# Patient Record
Sex: MALE | Race: WHITE | NOT HISPANIC OR LATINO
[De-identification: names, ages, dates, MRNs, and addresses within clinical notes are randomized per-mention and may not be internally consistent; named-entity substitution may affect disease eponyms.]

---

## 2017-08-05 ENCOUNTER — TRANSCRIPTION ENCOUNTER (OUTPATIENT)
Age: 57
End: 2017-08-05

## 2019-11-12 ENCOUNTER — EMERGENCY (EMERGENCY)
Facility: HOSPITAL | Age: 59
LOS: 0 days | Discharge: HOME | End: 2019-11-12
Admitting: EMERGENCY MEDICINE
Payer: COMMERCIAL

## 2019-11-12 VITALS
HEART RATE: 78 BPM | RESPIRATION RATE: 18 BRPM | OXYGEN SATURATION: 98 % | SYSTOLIC BLOOD PRESSURE: 165 MMHG | DIASTOLIC BLOOD PRESSURE: 102 MMHG

## 2019-11-12 DIAGNOSIS — R04.0 EPISTAXIS: ICD-10-CM

## 2019-11-12 PROCEDURE — 30903 CONTROL OF NOSEBLEED: CPT

## 2019-11-12 PROCEDURE — 99283 EMERGENCY DEPT VISIT LOW MDM: CPT | Mod: 25

## 2019-11-12 RX ORDER — OXYCODONE AND ACETAMINOPHEN 5; 325 MG/1; MG/1
1 TABLET ORAL ONCE
Refills: 0 | Status: DISCONTINUED | OUTPATIENT
Start: 2019-11-12 | End: 2019-11-12

## 2019-11-12 RX ADMIN — OXYCODONE AND ACETAMINOPHEN 1 TABLET(S): 5; 325 TABLET ORAL at 11:02

## 2019-11-12 NOTE — ED PROVIDER NOTE - PROVIDER TOKENS
FREE:[LAST:[your ENT White or return to ED in 2 days for packing removal],PHONE:[(   )    -],FAX:[(   )    -]]

## 2019-11-12 NOTE — ED PROVIDER NOTE - CLINICAL SUMMARY MEDICAL DECISION MAKING FREE TEXT BOX
Pt is a 59 year old male who presents to ED with spontaneous epistaxis from R nare. Pt attempted direct pressure with no relief. No bleeding into oropharynx and no septal hematoma noted. Rhinorocket 5.5 placed in R nare which subsided bleeding. Pt will f/u with ENT

## 2019-11-12 NOTE — ED PROVIDER NOTE - OBJECTIVE STATEMENT
Pt is a 59 year old male who presents to ED c/o nose bleed. Pt states he began to have a spontaneous nosebleed at approx. 0800. Pt denies any trauma to area. Pt states he applied direct pressure for 30 minutes however bleeding continued which is why pt came into ED. Pt states has had nose bleed in past from same nare, which required packing. Pt denies any HA, blurry vision. Denies chest pain, abdominal pain or SOB

## 2019-11-12 NOTE — ED PROVIDER NOTE - PATIENT PORTAL LINK FT
You can access the FollowMyHealth Patient Portal offered by [facility] by registering at the following website: [email or web address]. By joining [employer]’s FollowMyHealth portal, you will also be able to view your health information using other applications (apps) compatible with our system.

## 2019-11-12 NOTE — ED PROVIDER NOTE - NS ED ROS FT
Constitutional: (-) fever  Eyes/ENT: (-) blurry vision, (+) epistaxis from R nare  Cardiovascular: (-) chest pain, (-) syncope  Respiratory: (-) cough, (-) shortness of breath  Gastrointestinal: (-) vomiting, (-) diarrhea  Musculoskeletal: (-) neck pain, (-) back pain, (-) joint pain  Integumentary: (-) rash, (-) edema  Neurological: (-) headache, (-) altered mental status

## 2019-11-12 NOTE — ED PROVIDER NOTE - CARE PROVIDER_API CALL
your ENT White or return to ED in 2 days for packing removal,   Phone: (   )    -  Fax: (   )    -  Follow Up Time:

## 2019-11-12 NOTE — ED ADULT NURSE NOTE - OBJECTIVE STATEMENT
pt c/o nosebleed for over one hour - previously had similar occurrence over 6 years ago and occasional nose bleeds in the past  . No meds and no pmh.  Pt also states he had a box fall which he caught with his right arm and there is significant bruising .

## 2019-11-12 NOTE — ED PROVIDER NOTE - PHYSICAL EXAMINATION
Physical Exam    Vital Signs: I have reviewed the initial vital signs.  Constitutional: well-nourished, appears stated age, no acute distress  Eyes: Conjunctiva pink, Sclera clear, PERRLA, EOMI.  Nose: epistaxis from R nare, no bleeding into oropharynx or septal hematoma noted  Cardiovascular: S1 and S2, regular rate, regular rhythm, well-perfused extremities, radial pulses equal and 2+  Respiratory: unlabored respiratory effort, clear to auscultation bilaterally no wheezing, rales and rhonchi  Gastrointestinal: soft, non-tender abdomen, no pulsatile mass, normal bowl sounds  Musculoskeletal: supple neck, no lower extremity edema, no midline tenderness  Integumentary: warm, dry, no rash  Neurologic: awake, alert, cranial nerves II-XII grossly intact, extremities’ motor and sensory functions grossly intact  Psychiatric: appropriate mood, appropriate affect

## 2019-11-12 NOTE — ED PROVIDER NOTE - PROGRESS NOTE DETAILS
I supervised PA fellow care Rhinorocket placed which controlled bleeding. Pt given percocet for pain management which relieved pain. Pt has ENT Dr. Knowles which he will f/u with.

## 2019-11-14 ENCOUNTER — EMERGENCY (EMERGENCY)
Facility: HOSPITAL | Age: 59
LOS: 0 days | Discharge: HOME | End: 2019-11-14
Admitting: EMERGENCY MEDICINE
Payer: COMMERCIAL

## 2019-11-14 VITALS
DIASTOLIC BLOOD PRESSURE: 86 MMHG | OXYGEN SATURATION: 99 % | RESPIRATION RATE: 18 BRPM | SYSTOLIC BLOOD PRESSURE: 143 MMHG | TEMPERATURE: 97 F | HEART RATE: 96 BPM

## 2019-11-14 DIAGNOSIS — Z48.00 ENCOUNTER FOR CHANGE OR REMOVAL OF NONSURGICAL WOUND DRESSING: ICD-10-CM

## 2019-11-14 DIAGNOSIS — R04.0 EPISTAXIS: ICD-10-CM

## 2019-11-14 PROCEDURE — 99282 EMERGENCY DEPT VISIT SF MDM: CPT

## 2019-11-14 NOTE — ED PROVIDER NOTE - CARE PROVIDER_API CALL
Shashank Russell)  Otolaryngology  [address] [place] [address], [address] [place] [address]  Phone: [phone]  Fax: [phone]  Follow Up Time:

## 2019-11-14 NOTE — ED PROVIDER NOTE - PATIENT PORTAL LINK FT
You can access the FollowMyHealth Patient Portal offered by [facility] by registering at the following website: [email or web address]. By joining [employer]’s FollowMyHealth portal, you will also be able to view your health information using other applications (apps) compatible with our system.

## 2019-11-14 NOTE — ED PROVIDER NOTE - PHYSICAL EXAMINATION
VITAL SIGNS: I have reviewed nursing notes and confirm.  CONSTITUTIONAL: Well-developed; well-nourished; in no acute distress.   SKIN: skin exam is warm and dry, no acute rash.    HEAD: Normocephalic; atraumatic.  EYES:  conjunctiva and sclera clear.  ENT: packing in place with no surrounding oozing, No nasal discharge; airway clear.  CARD: S1, S2 normal; no murmurs, gallops, or rubs. Regular rate and rhythm.   RESP: No wheezes, rales or rhonchi.  EXT: Normal ROM.  No clubbing, cyanosis or edema.   NEURO: Alert, oriented, grossly unremarkable

## 2019-11-14 NOTE — ED PROVIDER NOTE - PROGRESS NOTE DETAILS
mild oozing after packing was removed, direct pressure was placed and will reassess, no septal hematoma

## 2019-11-14 NOTE — ED PROVIDER NOTE - CLINICAL SUMMARY MEDICAL DECISION MAKING FREE TEXT BOX
packing removed, mild oozing soon after direct pressure applied, with persistent oozing, surgicel placed, pt has appt with ENT, will f/u, given strict return precautions

## 2019-11-14 NOTE — ED PROVIDER NOTE - OBJECTIVE STATEMENT
Pt is a 58y/o male presents today for eval of nasal packing removal after placed 2 days ago for right nare epistaxis. Pt denies lightheadedness, palpitations

## 2019-11-14 NOTE — ED PROVIDER NOTE - NS ED ROS FT
ENMT:  + epistaxis No hearing changes, pain, discharge or infections. No neck pain or stiffness.  Cardiac:  No chest pain, SOB or edema. No chest pain with exertion.  MS:  No myalgia, muscle weakness, joint pain or back pain.  Neuro:  No headache or weakness.  No LOC.  Skin:  No skin rash.   Endocrine: No history of thyroid disease or diabetes.  Except as documented in the HPI,  all other systems are negative.

## 2019-11-15 ENCOUNTER — EMERGENCY (EMERGENCY)
Facility: HOSPITAL | Age: 59
LOS: 0 days | Discharge: HOME | End: 2019-11-15
Admitting: EMERGENCY MEDICINE
Payer: COMMERCIAL

## 2019-11-15 VITALS
TEMPERATURE: 98 F | HEART RATE: 72 BPM | RESPIRATION RATE: 18 BRPM | DIASTOLIC BLOOD PRESSURE: 88 MMHG | OXYGEN SATURATION: 98 % | SYSTOLIC BLOOD PRESSURE: 154 MMHG

## 2019-11-15 DIAGNOSIS — R05 COUGH: ICD-10-CM

## 2019-11-15 DIAGNOSIS — R04.0 EPISTAXIS: ICD-10-CM

## 2019-11-15 PROCEDURE — 99283 EMERGENCY DEPT VISIT LOW MDM: CPT

## 2019-11-15 RX ORDER — OXYMETAZOLINE HYDROCHLORIDE 0.5 MG/ML
2 SPRAY NASAL
Qty: 1 | Refills: 0
Start: 2019-11-15 | End: 2019-11-17

## 2019-11-15 NOTE — ED ADULT TRIAGE NOTE - CHIEF COMPLAINT QUOTE
Pt had balloon put in right nostril for epistaxis on Tuesday, but started having nose bleed again today. Denies taking blood thinners.

## 2019-11-15 NOTE — ED PROVIDER NOTE - OBJECTIVE STATEMENT
58 yo M c/o nose bleed from right nostril since Tuesday. He was seen in ED and was packed on Tuesday. Packing was removed yesterday and surgicel was placed to right nare. Today after coughing patient started bleeding  again from right nares. He applied pressure to nose. No lightheaded, CP, or syncope. Patient made appt with ENT Dr. White for next month.

## 2019-11-15 NOTE — ED PROVIDER NOTE - PROGRESS NOTE DETAILS
Spoke with ENT PA, surgicel can stay in place, prescribe Afrin, f/u with ENT patient was able to make appt with Dr. Chowdhury for next Wednesday

## 2019-11-15 NOTE — ED ADULT NURSE NOTE - NSFALLRSKASSESASSIST_ED_ALL_ED
[facility] EMERGENCY DEPT 
[facility] [phone] 
[phone] Work/School Note Date: 2/23/2018 To Whom It May concern: 
 
Magaly Wright was seen and treated today in the emergency room by the following provider(s): 
Attending Provider: Rima Herrera MD.   
 
Phil Lock may return to work on 02/28/2018. Sincerely, Yusra Bowens RN 
 
 
 

no

## 2019-11-15 NOTE — ED PROVIDER NOTE - PATIENT PORTAL LINK FT
You can access the FollowMyHealth Patient Portal offered by [facility] by registering at the following website: [email or web address]. By joining [employer]’s FollowMyHealth portal, you will also be able to view your health information using other applications (apps) compatible with our system.

## 2019-11-15 NOTE — ED ADULT NURSE NOTE - OBJECTIVE STATEMENT
presented to Ed with epistaxis to right sided nostril - previously in Ed with same symptoms. Dried blood noted to right sided nostril. patient dnies any acute pain or distress. Denies any dizziness, no LOC, no n/v/d.

## 2019-11-15 NOTE — ED PROVIDER NOTE - CARE PROVIDER_API CALL
Shashank Russell)  Otolaryngology  [address] [place] [address], [address] [place] [address]  Phone: [phone]  Fax: [phone]  Follow Up Time: 1-3 Days

## 2019-11-15 NOTE — ED PROVIDER NOTE - NS ED ROS FT
Review of Systems    Constitutional: (-) fever, (-) chills  Eyes/ENT: (-) blurry vision, (+) epistaxis, (-) sore throat  Cardiovascular: (-) chest pain, (-) syncope  Respiratory: (+) cough, (-) shortness of breath  Gastrointestinal: (-) pain, (-) nausea, (-) vomiting, (-) diarrhea  Musculoskeletal: (-) neck pain, (-) back pain, (-) joint pain  Integumentary: (-) rash, (-) edema  Neurological: (-) headache, (-) altered mental status

## 2019-11-15 NOTE — ED PROVIDER NOTE - PHYSICAL EXAMINATION
Gen: Alert, NAD, well appearing  Head: NC, AT, PERRL, EOMI, normal lids/conjunctiva  ENT: normal hearing, patent oropharynx without erythema/exudate. No epistaxis at this time. + surgicel in right nares. No blood noted in throat.   Neck: +supple, no tenderness/meningismus,  Mskel: no edema/erythema/cyanosis  Skin: no rash, warm/dry  Neuro: AAOx3, no sensory/motor deficits

## 2019-11-16 ENCOUNTER — EMERGENCY (EMERGENCY)
Facility: HOSPITAL | Age: 59
LOS: 0 days | Discharge: HOME | End: 2019-11-16
Attending: EMERGENCY MEDICINE | Admitting: EMERGENCY MEDICINE
Payer: COMMERCIAL

## 2019-11-16 VITALS
RESPIRATION RATE: 18 BRPM | WEIGHT: 210.1 LBS | DIASTOLIC BLOOD PRESSURE: 88 MMHG | SYSTOLIC BLOOD PRESSURE: 152 MMHG | TEMPERATURE: 97 F | OXYGEN SATURATION: 97 % | HEART RATE: 75 BPM

## 2019-11-16 DIAGNOSIS — R04.0 EPISTAXIS: ICD-10-CM

## 2019-11-16 DIAGNOSIS — Z79.899 OTHER LONG TERM (CURRENT) DRUG THERAPY: ICD-10-CM

## 2019-11-16 PROCEDURE — 99283 EMERGENCY DEPT VISIT LOW MDM: CPT

## 2019-11-16 RX ORDER — AZTREONAM 2 G
1 VIAL (EA) INJECTION
Qty: 6 | Refills: 0
Start: 2019-11-16 | End: 2019-11-18

## 2019-11-16 RX ORDER — OXYCODONE AND ACETAMINOPHEN 5; 325 MG/1; MG/1
1 TABLET ORAL ONCE
Refills: 0 | Status: DISCONTINUED | OUTPATIENT
Start: 2019-11-16 | End: 2019-11-16

## 2019-11-16 RX ADMIN — OXYCODONE AND ACETAMINOPHEN 1 TABLET(S): 5; 325 TABLET ORAL at 15:12

## 2019-11-16 NOTE — CONSULT NOTE ADULT - SUBJECTIVE AND OBJECTIVE BOX
HPI: Pt is a 59 year old male with no PMH, presents to ED with recurrent Epistaxis from R nare. Pt has been seen in ED 4 out of the last 5 days for recurrent epistaxis. Pt has received (1) rhinorocket 5.5, removed 2d ago and again had continued bleeds, pt then treated with nasal packing/ cautery, however still has slow oozing, which persists today. Pt has ENT follow up this Wednesday. Pt denies any recent trauma or digital trauma. Pt denies any AC use. Pt denies any PETERSEN, change in vision, dizziness. syncope or near syncope.    Allergies  No Known Allergies    ROS:   ENT: all negative except as noted in HPI   CV: denies palpitations  Pulm: denies SOB, cough, hemoptysis  GI: denies change in apetite, indigestion, n/v  : denies pertinent urinary symptoms, urgency  Neuro: denies numbness/tingling, loss of sensation  Psych: denies anxiety  MS: denies muscle weakness, instability  Heme: denies easy bruising or bleeding  Endo: denies heat/cold intolerance, excessive sweating  Vascular: denies LE edema    Vital Signs Last 24 Hrs  T(C): 36.2 (16 Nov 2019 12:49), Max: 36.2 (16 Nov 2019 12:49)  T(F): 97.2 (16 Nov 2019 12:49), Max: 97.2 (16 Nov 2019 12:49)  HR: 75 (16 Nov 2019 12:49) (75 - 75)  BP: 152/88 (16 Nov 2019 12:49) (152/88 - 152/88)  RR: 18 (16 Nov 2019 12:49) (18 - 18)  SpO2: 97% (16 Nov 2019 12:49) (97% - 97%)    PHYSICAL EXAM:  Gen: awake, alert, NAD  HEENT: Head NC/AT. L nare patent, no blood/discharge noted. R nare with +surgical in place no active bleed noted. Removed surgicel, no bleed noted  I placed a 5.5cm Rhino Rocket in R nare due to pt and ED's history of steady epistaxis this morning.

## 2019-11-16 NOTE — ED PROVIDER NOTE - NS ED ROS FT
Constitutional: (-) fever  Eyes/ENT: (-) blurry vision, (+) epistaxis from R nare   Cardiovascular: (-) chest pain, (-) syncope  Respiratory: (-) cough, (-) shortness of breath  Gastrointestinal: (-) vomiting, (-) diarrhea  Musculoskeletal: (-) neck pain, (-) back pain, (-) joint pain  Integumentary: (-) rash, (-) edema  Neurological: (-) headache, (-) altered mental status  Psychiatric: (-) hallucinations  Allergic/Immunologic: (-) pruritus

## 2019-11-16 NOTE — ED PROVIDER NOTE - PHYSICAL EXAMINATION
Physical Exam    Vital Signs: I have reviewed the initial vital signs.  Constitutional: well-nourished, appears stated age, no acute distress  Eyes: Conjunctiva pink, Sclera clear, PERRLA, EOMI.  Nose: MMM, R nare slow bleeding vessel, L nare normal in appearance no bleeding. No septal hematoma  Throat: No blood in oropharynx, no laryngeo or angio edema   Musculoskeletal: supple neck, no lower extremity edema, no midline tenderness  Integumentary: warm, dry, no rash  Neurologic: awake, alert, cranial nerves II-XII grossly intact, extremities’ motor and sensory functions grossly intact  Psychiatric: appropriate mood, appropriate affect

## 2019-11-16 NOTE — ED PROVIDER NOTE - OBJECTIVE STATEMENT
Pt is a 59 year old male with no PMH, presents to ED with recurrent Epistaxis from R nare. Pt has been seen in ED 4 out of the last 5 days for recurrent epistaxis. Pt has received (1) rhinorocket 5.5, removed 2d ago and again had continued bleeds, pt then treated with nasal packing/ cautery, however still has slow oozing, which persists today. Pt has ENT follow up this Wednesday. Pt denies any recent trauma or digital trauma. Pt denies any AC use. Pt denies any PETERSEN, change in vision, dizziness. syncope or near syncope.

## 2019-11-16 NOTE — CONSULT NOTE ADULT - ASSESSMENT
60y/o M with epistaxis    - Cont nasal packing for 48h, f/u in ENT outpatient to have packing removed  - TSS ppx  - Analgesia

## 2019-11-16 NOTE — ED PROVIDER NOTE - PATIENT PORTAL LINK FT
You can access the FollowMyHealth Patient Portal offered by [facility] by registering at the following website: [email or web address]. By joining [employer]’s FollowMyHealth portal, you will also be able to view your health information using other applications (apps) compatible with our system.

## 2019-11-16 NOTE — ED PROVIDER NOTE - ATTENDING CONTRIBUTION TO CARE
60 yo M no pmh presents with epistaxis. In last few days has been having recurrent episodes of nose bleed, mainly in right nare. Has had 4 ED visits for it. Has packing placed but removed 2 days ago. Bleeding restarted so came in for evaluation. no dizziness, no headache, no blood thinners, no similar episodes in the past. hx of nasal septum surgery >10 years ago. no trauma. Was following with Dr. minor.       CONSTITUTIONAL: Well-developed; well-nourished; in no acute distress.   SKIN: warm, dry  HEAD: Normocephalic; atraumatic.  EYES: PERRL, EOMI, no conjunctival erythema  ENT: epistasis from the right nare, anterior bleeding, no dripping back the posterior throat.   NECK: Supple; non tender.  CARD: S1, S2 normal;  Regular rate and rhythm.   RESP: No wheezes, rales or rhonchi.  ABD: soft non tender, non distended, no rebound or guarding  EXT: Normal ROM.    LYMPH: No acute cervical adenopathy.  NEURO: Alert, oriented, grossly unremarkable.

## 2019-11-16 NOTE — ED ADULT NURSE NOTE - OBJECTIVE STATEMENT
pt with reckandacerenchristi nosebleed. was here for same issure but believes it is starting from other nostril. no blood thinner use.

## 2019-11-16 NOTE — ED ADULT TRIAGE NOTE - BP NONINVASIVE DIASTOLIC (MM HG)
89 Pt with intermittent left sided back and abdominal pain over past three days and c/o dysuria. Dad has hx of kidney stones, pt has never had. Abdomen soft, nondistended, tender on right side. Mild CVA tender to right side. PT reports 5/10 pain now, states that its a 9/10 at its worst. No vomiting. Pt had fever last night  to 101, no fever toady.

## 2019-11-16 NOTE — ED PROVIDER NOTE - CLINICAL SUMMARY MEDICAL DECISION MAKING FREE TEXT BOX
Patient presents with epistaxis, controlled with anterior packing. ENT consulted. Discharged with antibiotics. Understand to follow up with ENT. Return precautions discussed.

## 2019-11-16 NOTE — ED PROVIDER NOTE - NSFOLLOWUPINSTRUCTIONS_ED_ALL_ED_FT
Follow up with your primary Medical doctor in 1-2 days as well your previously scheduled appt at ENT on monday for Nasal packing removal    Nosebleed, Adult  A nosebleed is when blood comes out of the nose. Nosebleeds are common. Usually, they are not a sign of a serious condition.    Nosebleeds can happen if a small blood vessel in your nose starts to bleed or if the lining of your nose (mucous membrane) cracks. They are commonly caused by:    Allergies.  Colds.  Picking your nose.  Blowing your nose too hard.  An injury from sticking an object into your nose or getting hit in the nose.  Dry or cold air.    Less common causes of nosebleeds include:    Toxic fumes.  Something abnormal in the nose or in the air-filled spaces in the bones of the face (sinuses).  Growths in the nose, such as polyps.  Medicines or conditions that cause blood to clot slowly.  Certain illnesses or procedures that irritate or dry out the nasal passages.    Follow these instructions at home:  When you have a nosebleed:     Sit down and tilt your head slightly forward.  Use a clean towel or tissue to pinch your nostrils under the bony part of your nose. After 10 minutes, let go of your nose and see if bleeding starts again. Do not release pressure before that time. If there is still bleeding, repeat the pinching and holding for 10 minutes until the bleeding stops.  Do not place tissues or gauze in the nose to stop bleeding.  Avoid lying down and avoid tilting your head backward. That may make blood collect in the throat and cause gagging or coughing.  Use a nasal spray decongestant to help with a nosebleed as told by your health care provider.  Image Do not use petroleum jelly or mineral oil in your nose. It can drip into your lungs.  After a nosebleed:     Avoid blowing your nose or sniffing for a number of hours.  Avoid straining, lifting, or bending at the waist for several days. You may resume other normal activities as you are able.  Use saline spray or a humidifier as told by your health care provider.  Aspirin and blood thinners make bleeding more likely. If you are prescribed these medicines and you suffer from nosebleeds:    Ask your health care provider if you should stop taking the medicines or if you should adjust the dose.  Do not stop taking medicines that your health care provider has recommended unless told by your health care provider.    If your nosebleed was caused by dry mucous membranes, use over-the-counter saline nasal spray or gel. This will keep the mucous membranes moist and allow them to heal. If you must use a lubricant:    Choose one that is water-soluble.  Use only as much as you need and use it only as often as needed.  Do not lie down until several hours after you use it.    Contact a health care provider if:  You have a fever.  You get nosebleeds often or more often than usual.  You bruise very easily.  You have a nosebleed from having something stuck in your nose.  You have bleeding in your mouth.  You vomit or cough up brown material.  You have a nosebleed after you start a new medicine.  Get help right away if:  You have a nosebleed after a fall or a head injury.  Your nosebleed does not go away after 20 minutes.  You feel dizzy or weak.  You have unusual bleeding from other parts of your body.  You have unusual bruising on other parts of your body.  You become sweaty.  You vomit blood.

## 2019-11-18 ENCOUNTER — EMERGENCY (EMERGENCY)
Facility: HOSPITAL | Age: 59
LOS: 0 days | Discharge: HOME | End: 2019-11-18
Attending: EMERGENCY MEDICINE | Admitting: EMERGENCY MEDICINE
Payer: COMMERCIAL

## 2019-11-18 VITALS
SYSTOLIC BLOOD PRESSURE: 141 MMHG | RESPIRATION RATE: 18 BRPM | DIASTOLIC BLOOD PRESSURE: 85 MMHG | TEMPERATURE: 98 F | HEART RATE: 78 BPM | OXYGEN SATURATION: 97 %

## 2019-11-18 DIAGNOSIS — R42 DIZZINESS AND GIDDINESS: ICD-10-CM

## 2019-11-18 PROCEDURE — 99283 EMERGENCY DEPT VISIT LOW MDM: CPT

## 2019-11-18 RX ORDER — MECLIZINE HCL 12.5 MG
25 TABLET ORAL ONCE
Refills: 0 | Status: COMPLETED | OUTPATIENT
Start: 2019-11-18 | End: 2019-11-18

## 2019-11-18 RX ORDER — MECLIZINE HCL 12.5 MG
1 TABLET ORAL
Qty: 6 | Refills: 0
Start: 2019-11-18 | End: 2019-11-19

## 2019-11-18 RX ADMIN — Medication 25 MILLIGRAM(S): at 14:33

## 2019-11-18 NOTE — ED PROVIDER NOTE - CARE PROVIDER_API CALL
Shashank Russell)  Otolaryngology  [address] [place] [address], [address] [place] [address]  Phone: [phone]  Fax: [phone]  Follow Up Time: 1-3 Days

## 2019-11-18 NOTE — ED PROVIDER NOTE - PROGRESS NOTE DETAILS
Shared decision making used- pt given option of removal here and now vs tomorrow with Dr. Russell and PT chooses follow up tomorrow. Will d/c with meclizine and strict return precautions.

## 2019-11-18 NOTE — ED PROVIDER NOTE - OBJECTIVE STATEMENT
59M with no significant pmh presents with request for removal of nasal packing placed Saturday. PT was in the ED Tuesday for epistaxis and had packing placed, then Thursday had it removed and surgicel placed, but rebled Saturday and told to return to Dr. Russell today for removal, however PT unable to secure appt until tomorrow at 11am. PT also states that he has been experiencing vertiginous sx. Denies n/v, numbness, weakness or slurring of speech. No AC.

## 2019-11-18 NOTE — ED ADULT TRIAGE NOTE - CHIEF COMPLAINT QUOTE
Pt had nosebleed x few days, pt has nasal packing in place from ED , pt now c/o dizziness and unable to get ENT appointment to remove packing Pt had nosebleed x few days, pt has nasal packing in place from ED , pt unable to get ENT appointment to remove packing

## 2019-11-18 NOTE — ED PROVIDER NOTE - ATTENDING CONTRIBUTION TO CARE
60 yo male here asking for removal of rt sided nasal packing  placed 2 days ago by ENT, he has an appointment with Dr Russell tomorrow.  Denies any bleeding today,  but developed dizziness since this morning, feels somewhat off balance , symptoms are worse with movement and associated with nausea without vomiting,  No fever, focal weakness or paresthesias.  Well-appearing, NAD, PERRL, supple neck, nml ear exam, packing in place, no blood in the posterior pharynx, nml work of breathing, no focal neuro deficits,  mild horizontal nystagmus with fast component to the right. .  Will give a dose of meclizine and reassess.  Patient unsure if he wants to take packing now, called Dr Russell and was told he can keep it in until tomorrow morning.  Will reassess.

## 2019-11-18 NOTE — ED ADULT NURSE NOTE - OBJECTIVE STATEMENT
Pt unable to get ENT appointment today, is requesting to remove nasal packing to R nostril. Pt c/o dizziness x 1 day. Denies any pain or other symptoms

## 2019-11-18 NOTE — ED PROVIDER NOTE - PHYSICAL EXAMINATION
CONSTITUTIONAL: Well-developed; well-nourished; in no acute distress.   SKIN: warm, dry  HEAD: Normocephalic; atraumatic.  EYES: PERRL, EOMI, no conjunctival erythema  ENT: No nasal discharge; airway clear. Anterior nasal packing in place in R naris. No posterior oropharyngeal blood.  NECK: Supple; non tender.  CARD: S1, S2 normal; no murmurs, gallops, or rubs. Regular rate and rhythm.   RESP: No wheezes, rales or rhonchi.  ABD: soft ntnd  EXT: Normal ROM.  No clubbing, cyanosis or edema.   LYMPH: No acute cervical adenopathy.  NEURO: Alert, oriented, grossly unremarkable. gait steady, no dysmetria, no pronator drift, speech clear, CN II-XII grossly intact with exception of bilateral nystagmus, negative romberg  PSYCH: Cooperative, appropriate.

## 2019-11-18 NOTE — ED PROVIDER NOTE - PATIENT PORTAL LINK FT
You can access the FollowMyHealth Patient Portal offered by [facility] by registering at the following website: [email or web address]. By joining [employer]’s FollowMyHealth portal, you will also be able to view your health information using other applications (apps) compatible with our system.

## 2019-11-18 NOTE — ED PROVIDER NOTE - NSFOLLOWUPINSTRUCTIONS_ED_ALL_ED_FT
Vertigo  ImageVertigo means that you feel like you are moving when you are not. Vertigo can also make you feel like things around you are moving when they are not. This feeling can come and go at any time. Vertigo often goes away on its own.    Follow these instructions at home:  Avoid making fast movements.  Avoid driving.  Avoid using heavy machinery.  Avoid doing any task or activity that might cause danger to you or other people if you would have a vertigo attack while you are doing it.  Sit down right away if you feel dizzy or have trouble with your balance.  Take over-the-counter and prescription medicines only as told by your doctor.  Follow instructions from your doctor about which positions or movements you should avoid.  Drink enough fluid to keep your pee (urine) clear or pale yellow.  Keep all follow-up visits as told by your doctor. This is important.  Contact a doctor if:  Medicine does not help your vertigo.  You have a fever.  Your problems get worse or you have new symptoms.  Your family or friends see changes in your behavior.  You feel sick to your stomach (nauseous) or you throw up (vomit).  You have a “pins and needles” feeling or you are numb in part of your body.  Get help right away if:  You have trouble moving or talking.  You are always dizzy.  You pass out (faint).  You get very bad headaches.  You feel weak or have trouble using your hands, arms, or legs.  You have changes in your hearing.  You have changes in your seeing (vision).  You get a stiff neck.  Bright light starts to bother you.  This information is not intended to replace advice given to you by your health care provider. Make sure you discuss any questions you have with your health care provider.

## 2019-11-18 NOTE — ED ADULT NURSE NOTE - CHIEF COMPLAINT QUOTE
Pt had nosebleed x few days, pt has nasal packing in place from ED , pt unable to get ENT appointment to remove packing

## 2019-11-19 ENCOUNTER — EMERGENCY (EMERGENCY)
Facility: HOSPITAL | Age: 59
LOS: 0 days | Discharge: HOME | End: 2019-11-19
Attending: EMERGENCY MEDICINE | Admitting: EMERGENCY MEDICINE
Payer: COMMERCIAL

## 2019-11-19 VITALS
TEMPERATURE: 96 F | SYSTOLIC BLOOD PRESSURE: 144 MMHG | RESPIRATION RATE: 18 BRPM | DIASTOLIC BLOOD PRESSURE: 85 MMHG | OXYGEN SATURATION: 100 % | HEART RATE: 72 BPM

## 2019-11-19 DIAGNOSIS — R04.0 EPISTAXIS: ICD-10-CM

## 2019-11-19 DIAGNOSIS — R51 HEADACHE: ICD-10-CM

## 2019-11-19 PROCEDURE — 99283 EMERGENCY DEPT VISIT LOW MDM: CPT

## 2019-11-19 NOTE — ED PROVIDER NOTE - PROGRESS NOTE DETAILS
Patient evaluated by BELLA Elmore, packing removed. Recommend observing patient for 30 mins for rebleeding. No rebleeding. Patient has an appointment with ENt Dr. Russell tomorrow. Patient understands return precautions.

## 2019-11-19 NOTE — ED PROVIDER NOTE - CARE PROVIDER_API CALL
Shashank Russell)  Otolaryngology  [address] [place] [address], [address] [place] [address]  Phone: [phone]  Fax: [phone]  Follow Up Time:

## 2019-11-19 NOTE — CONSULT NOTE ADULT - ASSESSMENT
Pt is a 59 y.o male who presents for epistaxis, removal of packing. Removed without complication    ·	pt to follow up with Dr Russell tomorrow in the office for FFL  ·	no sprays into nare, just ointment  ·	return to ED if any bleeding  ·	w/d with attng

## 2019-11-19 NOTE — ED PROVIDER NOTE - NS ED ROS FT
Constitutional: no fever, chills   Eyes: no visual changes, no eye pain  ENT: + packing in place right nose. no active bleeding. no URI sxs, no throat pain, no change in voice, no excessive drooling  Cardiovascular: no chest pain, no sob  Respiratory: no cough, no shortness of breath  Gastrointestinal: no nausea, vomiting. no abdominal pain.  Musculoskeletal: no msk pain or injury.   Integumentary: no rash or skin changes. no edema  Neurological: no headache, no dizziness, no visual changes, no UE/LE weakness or paresthesias. no change in mental status. no neck pain or stiffness.

## 2019-11-19 NOTE — ED PROVIDER NOTE - PATIENT PORTAL LINK FT
You can access the FollowMyHealth Patient Portal offered by [facility] by registering at the following website: [email or web address]. By joining [employer]’s FollowMyHealth portal, you will also be able to view your health information using other applications (apps) compatible with our system.

## 2019-11-19 NOTE — ED PROVIDER NOTE - OBJECTIVE STATEMENT
60 yo male wit no significant pmh presents to the ED for removal of nasal packing to right nose. Patient explains hes been seen in the ED multiple times for reoccurrent epistaxis. Patient had an appointment at Dr. Russell's office today for removal of packing however, they were closed (last minute) and sent to the ED for packing removal. Patient states ENT PA in hospital was already contacted for packing removal - patient states he only wants the ENT PA to remove the packing. Associated with mild headache. Denies fever, chills, neck pain/stiffness, N/V, chest pain, sob, abd pain, UE/LE weakness or paresthesias. Patient states he has not had any bleeding in the last 24 hours.

## 2019-11-19 NOTE — ED PROVIDER NOTE - CLINICAL SUMMARY MEDICAL DECISION MAKING FREE TEXT BOX
59 male here for nasal packing removal. Had ENT Consult in ED. Will discharge with outpatient management and return and follow up instructions.

## 2019-11-19 NOTE — CONSULT NOTE ADULT - SUBJECTIVE AND OBJECTIVE BOX
Pt is a 59 y.o male who presents for epistaxis, removal of packing. PT had epistaxis last week on Tuesday, had nasal packing placed - removed on Thursday with some rebleeding, had surgicel placed. PT rebled on Saturday, was repacked - no bleeding issues since. PT presents to the ED for removal of packing. PT denies any AC use, no bleeding disorders. PT has one history of epistaxis in the past - 6 years ago, had septal surgery, resolved on its own.    PAST MEDICAL & SURGICAL HISTORY:  No pertinent past medical history  No significant past surgical history  Home Medications:  None pertinent  Allergies    No Known Allergies    Intolerances    Vital Signs Last 24 Hrs  T(C): 35.8 (19 Nov 2019 11:18), Max: 36.6 (18 Nov 2019 13:47)  T(F): 96.5 (19 Nov 2019 11:18), Max: 97.9 (18 Nov 2019 13:47)  HR: 72 (19 Nov 2019 11:18) (72 - 78)  BP: 144/85 (19 Nov 2019 11:18) (141/85 - 144/85)  RR: 18 (19 Nov 2019 11:18) (18 - 18)  SpO2: 100% (19 Nov 2019 11:18) (97% - 100%)    GEN: NAD, awake and alert.  HEENT: + rapid rhino in place, no active bleeding or clots noted to nare. balloon deflated at bedside, no active bleeding. packing removed without any issues. saline and baci placed to nare

## 2019-11-19 NOTE — ED PROVIDER NOTE - CHIEF COMPLAINT
Dermatology Inpatient Consult Note:  6/30/2017  5:40 PM    Reason for consult:  Rash in groin     HPI: 72 y.o. yo male with PMH of CHARLES, CAD, skin cancer, hypothyroidism, and HLP who was transferred from OSH for further evaluation and management of rapidly progressive ascend ing paralysis which led to respiratory failure and intubation. 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     Off note, few days prior to admission to OSH he had prostate biopsy and was treated with cipro.  There was a reported h/o hiking and camping in [place] and [place] recently. Lyme ab panel was sent and results are pending.       Scheduled Meds:   aspirin  325 mg Per NG tube Daily    bisacodyl  10 mg Rectal Daily    chlorhexidine  15 mL Mouth/Throat QID    folic acid-vit B6-vit B12 2.5-25-2 mg  1 tablet Per NG tube Daily    insulin detemir  20 Units Subcutaneous BID    levothyroxine  75 mcg Per NG tube Daily    metoprolol tartrate  25 mg Per OG tube BID    pantoprazole  40 mg Per NG tube Daily    polyethylene glycol  17 g Per NG tube Daily    senna-docusate 8.6-50 mg  2 tablet Per OG tube BID    sertraline  50 mg Per NG tube Daily    thiamine  100 mg Per NG tube Daily    white petrolatum-mineral oil   Both Eyes QHS     Continuous Infusions:   sodium chloride 0.9% 75 mL/hr at 06/30/17 0800    heparin (porcine) in D5W Stopped (06/29/17 2247)     PRN Meds:.sodium chloride, sodium chloride, sodium chloride, acetaminophen, dextrose 50%, fentaNYL, fentaNYL, glucagon (human recombinant), heparin, porcine (PF), hydrALAZINE, insulin aspart, magnesium oxide, magnesium oxide, midazolam (PF), ondansetron, potassium chloride 10%, potassium chloride 10%, potassium chloride 10%, potassium, sodium phosphates, potassium, sodium phosphates, potassium, sodium phosphates, rocuronium    Review of patient's allergies indicates:  No Known Allergies    History reviewed. No  The patient is a 59y Male complaining of medical evaluation. pertinent past medical history.    No past surgical history on file.    Social History     Social History    Marital status: Unknown     Spouse name: N/A    Number of children: N/A    Years of education: N/A     Occupational History    Not on file.     Social History Main Topics    Smoking status: Unknown If Ever Smoked    Smokeless tobacco: Not on file    Alcohol use Not on file    Drug use: Unknown    Sexual activity: Not on file     Other Topics Concern    Not on file     Social History Narrative    No narrative on file       No family history on file.    Review of Systems:  Unobtainable as patient is unconscious     Physical Exam:  Vitals:    06/30/17 1709   BP:    Pulse: (!) 59   Resp: 20   Temp:        Skin  Groin, suprapubic abdomen and medial thighs:   - multiple erythematous papules and pustules                     Assessment and Plan:    71 yo M with ascending paralysis and cadmium toxicity presenting with psutules and erythematous papules of one day duration     DDX: folliculitis vs a drug reaction vs early acute generalized pustular eruption (AGEP) vs miliaria pustulosa    - Punch biopsy performed today   - One of the pustules de roofed and cultured for bacteria.   - Apply Vaseline to biopsy site and cover with Vaseline     Punch biopsy procedure note:  Punch biopsy performed after verbal consent obtained. Area marked and prepped with alcohol. Approximately 1cc of 1% lidocaine with epinephrine injected. 4 mm disposable punch used to remove lesion. Hemostasis obtained and biopsy site closed with gel foam. Wound care instructions reviewed with patient and handout given.    Above plan discussed with Dr. Solares. Please call with any questions/concerns, or changes to this patient's skin.      Cruz Carpenter MD MPH  PGY2  Pager [phone]

## 2019-11-19 NOTE — ED PROVIDER NOTE - PHYSICAL EXAMINATION
GENERAL:  well appearing, non-toxic male in no acute distress  SKIN: skin warm, pink and dry. MMM. no rash. no pallor or diaphoresis. I  ENT:  Airway intact. Patent oropharynx. Packing in place right nostril. no active bleeding. no blood in posterior pharynx.  Uvula midline. TMs clear b/l.   PULM: CTAB. Normal respiratory effort. No respiratory distress. No wheezes, stridor, rales or rhonchi. No retractions  CV: RRR, no M/R/G.   MSK: moving all extremities. No edema, erythema, cyanosis. Distal pulses intact.  NEURO: A+Ox3, no sensory/motor deficits  PSYCH: appropriate behavior, cooperative.

## 2019-11-19 NOTE — ED PROVIDER NOTE - ATTENDING CONTRIBUTION TO CARE
I personally evaluated the patient. I reviewed the Resident’s or Physician Assistant’s note (as assigned above), and agree with the findings and plan except as documented in my note.     59 male here for nasal packing removal sent to ED for ENT consult by the ENT office as no provider was present. Has no complaints. Packing placed for nosebleed several days ago and is on ABX as outpatient.     PE: male in no distress. HEENT right nare with recently removed nasal packing. SKIN warm dry normal.     Impression: nasal packing removal    Plan: supportive care and outpatient management

## 2019-11-20 ENCOUNTER — APPOINTMENT (OUTPATIENT)
Dept: OTOLARYNGOLOGY | Facility: CLINIC | Age: 59
End: 2019-11-20
Payer: COMMERCIAL

## 2019-11-20 VITALS — HEART RATE: 68 BPM | SYSTOLIC BLOOD PRESSURE: 145 MMHG | DIASTOLIC BLOOD PRESSURE: 94 MMHG

## 2019-11-20 DIAGNOSIS — R04.0 EPISTAXIS: ICD-10-CM

## 2019-11-20 PROCEDURE — 31231 NASAL ENDOSCOPY DX: CPT

## 2019-11-20 PROCEDURE — 99203 OFFICE O/P NEW LOW 30 MIN: CPT | Mod: 25

## 2019-11-20 NOTE — HISTORY OF PRESENT ILLNESS
[de-identified] : 59 year old patient is present today for epistaxis.  He denies trauma. Patient began having right sided epistaxis 8 days ago. Patient was admitted to the hospital He was packed twice, the last packing was d/c'd yesterday. He had a similar episode 6 years ago on the same side requiring packing.  He does not have a h/o HTN. Patient was previously taking baby ASA but had stopped it a few days before epistaxis began because he ran out of it.  His bp is currently 145/94. Patient is c/o HTN and blurry vision now. He previously had an off balance feeling which has resolved.

## 2019-11-20 NOTE — REVIEW OF SYSTEMS
[Patient Intake Form Reviewed] : Patient intake form was reviewed [As Noted in HPI] : as noted in HPI [Negative] : Heme/Lymph [FreeTextEntry1] : all othr ros negative

## 2019-11-20 NOTE — PHYSICAL EXAM
[Midline] : trachea located in midline position [Nasal Endoscopy Performed] : nasal endoscopy was performed, see procedure section for findings [Normal] : inferior turbinates and middle turbinates are normal

## 2019-11-20 NOTE — PROCEDURE
[Epistaxis] : evaluation of epistaxis [Topical Lidocaine] : topical lidocaine [Rigid Endoscope] : examined with a rigid endoscope [Oxymetazoline HCl] : oxymetazoline HCl [Congested] : congested [Normal] : the paranasal sinuses had no abnormalities [FreeTextEntry6] : The following anatomic sites were directly examined in a sequential fashion:\par The scope was introduced in the nasal passage between the middle and inferior turbinates to exam the inferior portion of the middle meatus and the fontanelle, as well as the maxillary ostia. Next, the scope was passed medically and posteriorly to the middle turbinates to examine the sphenoethmoid recess and the superior turbinate region.\par

## 2020-01-30 ENCOUNTER — EMERGENCY (EMERGENCY)
Facility: HOSPITAL | Age: 60
LOS: 0 days | Discharge: HOME | End: 2020-01-30
Admitting: EMERGENCY MEDICINE
Payer: COMMERCIAL

## 2020-01-30 VITALS
OXYGEN SATURATION: 98 % | HEART RATE: 65 BPM | TEMPERATURE: 98 F | DIASTOLIC BLOOD PRESSURE: 98 MMHG | RESPIRATION RATE: 20 BRPM | SYSTOLIC BLOOD PRESSURE: 163 MMHG

## 2020-01-30 DIAGNOSIS — M79.662 PAIN IN LEFT LOWER LEG: ICD-10-CM

## 2020-01-30 DIAGNOSIS — L53.9 ERYTHEMATOUS CONDITION, UNSPECIFIED: ICD-10-CM

## 2020-01-30 DIAGNOSIS — I80.02 PHLEBITIS AND THROMBOPHLEBITIS OF SUPERFICIAL VESSELS OF LEFT LOWER EXTREMITY: ICD-10-CM

## 2020-01-30 PROCEDURE — 99284 EMERGENCY DEPT VISIT MOD MDM: CPT

## 2020-01-30 PROCEDURE — 93971 EXTREMITY STUDY: CPT | Mod: 26,LT

## 2020-01-30 RX ORDER — CEPHALEXIN 500 MG
500 CAPSULE ORAL ONCE
Refills: 0 | Status: COMPLETED | OUTPATIENT
Start: 2020-01-30 | End: 2020-01-30

## 2020-01-30 RX ORDER — CEPHALEXIN 500 MG
1 CAPSULE ORAL
Qty: 28 | Refills: 0
Start: 2020-01-30 | End: 2020-02-05

## 2020-01-30 RX ADMIN — Medication 500 MILLIGRAM(S): at 22:10

## 2020-01-30 NOTE — ED PROVIDER NOTE - PATIENT PORTAL LINK FT
You can access the FollowMyHealth Patient Portal offered by [facility] by registering at the following website: [email or web address]. By joining [email or web address]’s FollowMyHealth portal, you will also be able to view your health information using other applications (apps) compatible with our system.

## 2020-01-30 NOTE — ED ADULT NURSE NOTE - CHPI ED NUR SYMPTOMS NEG
no nausea/no vomiting/no dizziness/no weakness/no tingling/no chills/no decreased eating/drinking/no fever

## 2020-01-30 NOTE — ED PROVIDER NOTE - CLINICAL SUMMARY MEDICAL DECISION MAKING FREE TEXT BOX
pt presented with left calf redness, no dvt on duplex. + thrombophlebitis on sono, and cellulitis on exam. recommendations for thrombophlebitis given to pt, and axbx given. will follow up with pmd dr zhao. strict return precautions given to pt

## 2020-01-30 NOTE — ED PROVIDER NOTE - OBJECTIVE STATEMENT
59 year old male with pmhx of HTN, presents with left leg redness x few days. Pt admits erythema and pain to left medial shin. no trauma, injury, paresthesias or swelling. no recent travel.

## 2020-01-30 NOTE — ED PROVIDER NOTE - NS ED ROS FT
Review of Systems:  	•	CONSTITUTIONAL - no fever, no diaphoresis, no chills  	•	SKIN - no rash  	•	HEMATOLOGIC - no bleeding, no bruising  	•	MUSCULOSKELETAL - no joint paint, no swelling, + redness  	•	NEUROLOGIC - no weakness, no paresthesias

## 2020-01-30 NOTE — ED PROVIDER NOTE - PROGRESS NOTE DETAILS
as per vascular tech small varicose vein thrombosis to left calf, directions given to pt. and axbx sent for superimrosed cellulitis. will follow up with pmd

## 2020-01-30 NOTE — ED ADULT NURSE NOTE - OBJECTIVE STATEMENT
patient complaints of left calf swelling and pain. Patient reports injury to the same site few years ago.

## 2020-01-30 NOTE — ED PROVIDER NOTE - PHYSICAL EXAMINATION
GEN: Patient in no acute distress  MS: Normal ROM in all extremities. No midline spinal tenderness. pulses 2 +. no calf tenderness or swelling.  SKIN: + small robe like vein to left medial shin with surrounding erythea, no abscess  NEURO: Strength 5/5 with no sensory deficits. Steady gait.

## 2021-06-28 ENCOUNTER — INPATIENT (INPATIENT)
Facility: HOSPITAL | Age: 61
LOS: 2 days | Discharge: HOME | End: 2021-07-01
Attending: INTERNAL MEDICINE | Admitting: INTERNAL MEDICINE
Payer: COMMERCIAL

## 2021-06-28 VITALS
WEIGHT: 210.1 LBS | DIASTOLIC BLOOD PRESSURE: 91 MMHG | TEMPERATURE: 98 F | HEART RATE: 77 BPM | SYSTOLIC BLOOD PRESSURE: 136 MMHG | OXYGEN SATURATION: 98 % | RESPIRATION RATE: 18 BRPM

## 2021-06-28 LAB
ALBUMIN SERPL ELPH-MCNC: 4.4 G/DL — SIGNIFICANT CHANGE UP (ref 3.5–5.2)
ALP SERPL-CCNC: 86 U/L — SIGNIFICANT CHANGE UP (ref 30–115)
ALT FLD-CCNC: 29 U/L — SIGNIFICANT CHANGE UP (ref 0–41)
ANION GAP SERPL CALC-SCNC: 10 MMOL/L — SIGNIFICANT CHANGE UP (ref 7–14)
APTT BLD: 35.3 SEC — SIGNIFICANT CHANGE UP (ref 27–39.2)
AST SERPL-CCNC: 36 U/L — SIGNIFICANT CHANGE UP (ref 0–41)
BASOPHILS # BLD AUTO: 0.07 K/UL — SIGNIFICANT CHANGE UP (ref 0–0.2)
BASOPHILS NFR BLD AUTO: 1 % — SIGNIFICANT CHANGE UP (ref 0–1)
BILIRUB SERPL-MCNC: 0.8 MG/DL — SIGNIFICANT CHANGE UP (ref 0.2–1.2)
BUN SERPL-MCNC: 16 MG/DL — SIGNIFICANT CHANGE UP (ref 10–20)
CALCIUM SERPL-MCNC: 9.3 MG/DL — SIGNIFICANT CHANGE UP (ref 8.5–10.1)
CHLORIDE SERPL-SCNC: 106 MMOL/L — SIGNIFICANT CHANGE UP (ref 98–110)
CO2 SERPL-SCNC: 27 MMOL/L — SIGNIFICANT CHANGE UP (ref 17–32)
CREAT SERPL-MCNC: 1 MG/DL — SIGNIFICANT CHANGE UP (ref 0.7–1.5)
EOSINOPHIL # BLD AUTO: 0.11 K/UL — SIGNIFICANT CHANGE UP (ref 0–0.7)
EOSINOPHIL NFR BLD AUTO: 1.6 % — SIGNIFICANT CHANGE UP (ref 0–8)
ETHANOL SERPL-MCNC: <10 MG/DL — SIGNIFICANT CHANGE UP
GLUCOSE SERPL-MCNC: 106 MG/DL — HIGH (ref 70–99)
HCT VFR BLD CALC: 40.1 % — LOW (ref 42–52)
HGB BLD-MCNC: 14.5 G/DL — SIGNIFICANT CHANGE UP (ref 14–18)
IMM GRANULOCYTES NFR BLD AUTO: 0.1 % — SIGNIFICANT CHANGE UP (ref 0.1–0.3)
INR BLD: 1.09 RATIO — SIGNIFICANT CHANGE UP (ref 0.65–1.3)
LYMPHOCYTES # BLD AUTO: 2.13 K/UL — SIGNIFICANT CHANGE UP (ref 1.2–3.4)
LYMPHOCYTES # BLD AUTO: 30.2 % — SIGNIFICANT CHANGE UP (ref 20.5–51.1)
MCHC RBC-ENTMCNC: 31.7 PG — HIGH (ref 27–31)
MCHC RBC-ENTMCNC: 36.2 G/DL — SIGNIFICANT CHANGE UP (ref 32–37)
MCV RBC AUTO: 87.7 FL — SIGNIFICANT CHANGE UP (ref 80–94)
MONOCYTES # BLD AUTO: 0.74 K/UL — HIGH (ref 0.1–0.6)
MONOCYTES NFR BLD AUTO: 10.5 % — HIGH (ref 1.7–9.3)
NEUTROPHILS # BLD AUTO: 4 K/UL — SIGNIFICANT CHANGE UP (ref 1.4–6.5)
NEUTROPHILS NFR BLD AUTO: 56.6 % — SIGNIFICANT CHANGE UP (ref 42.2–75.2)
NRBC # BLD: 0 /100 WBCS — SIGNIFICANT CHANGE UP (ref 0–0)
PLATELET # BLD AUTO: 180 K/UL — SIGNIFICANT CHANGE UP (ref 130–400)
POTASSIUM SERPL-MCNC: 3.5 MMOL/L — SIGNIFICANT CHANGE UP (ref 3.5–5)
POTASSIUM SERPL-SCNC: 3.5 MMOL/L — SIGNIFICANT CHANGE UP (ref 3.5–5)
PROT SERPL-MCNC: 7 G/DL — SIGNIFICANT CHANGE UP (ref 6–8)
PROTHROM AB SERPL-ACNC: 12.5 SEC — SIGNIFICANT CHANGE UP (ref 9.95–12.87)
RBC # BLD: 4.57 M/UL — LOW (ref 4.7–6.1)
RBC # FLD: 12.7 % — SIGNIFICANT CHANGE UP (ref 11.5–14.5)
SODIUM SERPL-SCNC: 143 MMOL/L — SIGNIFICANT CHANGE UP (ref 135–146)
TROPONIN T SERPL-MCNC: <0.01 NG/ML — SIGNIFICANT CHANGE UP
WBC # BLD: 7.06 K/UL — SIGNIFICANT CHANGE UP (ref 4.8–10.8)
WBC # FLD AUTO: 7.06 K/UL — SIGNIFICANT CHANGE UP (ref 4.8–10.8)

## 2021-06-28 PROCEDURE — 0042T: CPT

## 2021-06-28 PROCEDURE — 70496 CT ANGIOGRAPHY HEAD: CPT | Mod: 26,MA

## 2021-06-28 PROCEDURE — 70498 CT ANGIOGRAPHY NECK: CPT | Mod: 26,MA

## 2021-06-28 PROCEDURE — 70450 CT HEAD/BRAIN W/O DYE: CPT | Mod: 26,MA

## 2021-06-28 PROCEDURE — 99291 CRITICAL CARE FIRST HOUR: CPT | Mod: 95

## 2021-06-28 PROCEDURE — 99291 CRITICAL CARE FIRST HOUR: CPT

## 2021-06-28 RX ORDER — ALTEPLASE 100 MG
8.6 KIT INTRAVENOUS ONCE
Refills: 0 | Status: COMPLETED | OUTPATIENT
Start: 2021-06-28 | End: 2021-06-28

## 2021-06-28 RX ORDER — ALTEPLASE 100 MG
77.2 KIT INTRAVENOUS ONCE
Refills: 0 | Status: COMPLETED | OUTPATIENT
Start: 2021-06-28 | End: 2021-06-28

## 2021-06-28 NOTE — ED ADULT TRIAGE NOTE - CHIEF COMPLAINT QUOTE
Pt came c/o sudden loss of vision in his left eye, pt was washing his hands and started seeing flashes of lights, denies any trauma. Pt came c/o sudden loss of vision in his left eye, pt was washing his hands and started seeing flashes of lights, denies any trauma, denies any numbness or tingling, no slurred speech .

## 2021-06-28 NOTE — ED ADULT TRIAGE NOTE - DIRECT TO ROOM CARE INITIATED:
08/02/18 1231 08/02/18 1232   Vital Signs   BP (!) 178/110 (!) 153/91   MAP (mmHg) 137 116   BP Location Right arm  (lower arm) Left arm  (lower arm)     Dr. Arrington notified. Will repeat in 15 mins as ordered.   28-Jun-2021 23:08

## 2021-06-28 NOTE — ED ADULT NURSE NOTE - CHIEF COMPLAINT QUOTE
Pt came c/o sudden loss of vision in his left eye, pt was washing his hands and started seeing flashes of lights, denies any trauma, denies any numbness or tingling, no slurred speech .

## 2021-06-29 LAB
ALBUMIN SERPL ELPH-MCNC: 4 G/DL — SIGNIFICANT CHANGE UP (ref 3.5–5.2)
ALP SERPL-CCNC: 72 U/L — SIGNIFICANT CHANGE UP (ref 30–115)
ALT FLD-CCNC: 27 U/L — SIGNIFICANT CHANGE UP (ref 0–41)
ANION GAP SERPL CALC-SCNC: 6 MMOL/L — LOW (ref 7–14)
APPEARANCE UR: CLEAR — SIGNIFICANT CHANGE UP
AST SERPL-CCNC: 30 U/L — SIGNIFICANT CHANGE UP (ref 0–41)
BASOPHILS # BLD AUTO: 0.07 K/UL — SIGNIFICANT CHANGE UP (ref 0–0.2)
BASOPHILS NFR BLD AUTO: 1.1 % — HIGH (ref 0–1)
BILIRUB SERPL-MCNC: 1 MG/DL — SIGNIFICANT CHANGE UP (ref 0.2–1.2)
BILIRUB UR-MCNC: NEGATIVE — SIGNIFICANT CHANGE UP
BUN SERPL-MCNC: 15 MG/DL — SIGNIFICANT CHANGE UP (ref 10–20)
CALCIUM SERPL-MCNC: 8.8 MG/DL — SIGNIFICANT CHANGE UP (ref 8.5–10.1)
CHLORIDE SERPL-SCNC: 107 MMOL/L — SIGNIFICANT CHANGE UP (ref 98–110)
CO2 SERPL-SCNC: 30 MMOL/L — SIGNIFICANT CHANGE UP (ref 17–32)
COLOR SPEC: SIGNIFICANT CHANGE UP
CREAT SERPL-MCNC: 0.9 MG/DL — SIGNIFICANT CHANGE UP (ref 0.7–1.5)
CRP SERPL-MCNC: <3 MG/L — SIGNIFICANT CHANGE UP
DIFF PNL FLD: NEGATIVE — SIGNIFICANT CHANGE UP
EOSINOPHIL # BLD AUTO: 0.08 K/UL — SIGNIFICANT CHANGE UP (ref 0–0.7)
EOSINOPHIL NFR BLD AUTO: 1.2 % — SIGNIFICANT CHANGE UP (ref 0–8)
ERYTHROCYTE [SEDIMENTATION RATE] IN BLOOD: 9 MM/HR — SIGNIFICANT CHANGE UP (ref 0–10)
GLUCOSE SERPL-MCNC: 110 MG/DL — HIGH (ref 70–99)
GLUCOSE UR QL: NEGATIVE — SIGNIFICANT CHANGE UP
HCT VFR BLD CALC: 38.6 % — LOW (ref 42–52)
HCV AB S/CO SERPL IA: 0.04 COI — SIGNIFICANT CHANGE UP
HCV AB SERPL-IMP: SIGNIFICANT CHANGE UP
HGB BLD-MCNC: 13.5 G/DL — LOW (ref 14–18)
IMM GRANULOCYTES NFR BLD AUTO: 0.3 % — SIGNIFICANT CHANGE UP (ref 0.1–0.3)
KETONES UR-MCNC: NEGATIVE — SIGNIFICANT CHANGE UP
LEUKOCYTE ESTERASE UR-ACNC: NEGATIVE — SIGNIFICANT CHANGE UP
LYMPHOCYTES # BLD AUTO: 1.87 K/UL — SIGNIFICANT CHANGE UP (ref 1.2–3.4)
LYMPHOCYTES # BLD AUTO: 28.9 % — SIGNIFICANT CHANGE UP (ref 20.5–51.1)
MAGNESIUM SERPL-MCNC: 2.1 MG/DL — SIGNIFICANT CHANGE UP (ref 1.8–2.4)
MCHC RBC-ENTMCNC: 30.9 PG — SIGNIFICANT CHANGE UP (ref 27–31)
MCHC RBC-ENTMCNC: 35 G/DL — SIGNIFICANT CHANGE UP (ref 32–37)
MCV RBC AUTO: 88.3 FL — SIGNIFICANT CHANGE UP (ref 80–94)
MONOCYTES # BLD AUTO: 0.63 K/UL — HIGH (ref 0.1–0.6)
MONOCYTES NFR BLD AUTO: 9.7 % — HIGH (ref 1.7–9.3)
NEUTROPHILS # BLD AUTO: 3.8 K/UL — SIGNIFICANT CHANGE UP (ref 1.4–6.5)
NEUTROPHILS NFR BLD AUTO: 58.8 % — SIGNIFICANT CHANGE UP (ref 42.2–75.2)
NITRITE UR-MCNC: NEGATIVE — SIGNIFICANT CHANGE UP
NRBC # BLD: 0 /100 WBCS — SIGNIFICANT CHANGE UP (ref 0–0)
PH UR: 7 — SIGNIFICANT CHANGE UP (ref 5–8)
PLATELET # BLD AUTO: 150 K/UL — SIGNIFICANT CHANGE UP (ref 130–400)
POTASSIUM SERPL-MCNC: 3.9 MMOL/L — SIGNIFICANT CHANGE UP (ref 3.5–5)
POTASSIUM SERPL-SCNC: 3.9 MMOL/L — SIGNIFICANT CHANGE UP (ref 3.5–5)
PROT SERPL-MCNC: 6 G/DL — SIGNIFICANT CHANGE UP (ref 6–8.3)
PROT SERPL-MCNC: 6 G/DL — SIGNIFICANT CHANGE UP (ref 6–8.3)
PROT SERPL-MCNC: 6.3 G/DL — SIGNIFICANT CHANGE UP (ref 6–8)
PROT UR-MCNC: SIGNIFICANT CHANGE UP
RBC # BLD: 4.37 M/UL — LOW (ref 4.7–6.1)
RBC # FLD: 12.6 % — SIGNIFICANT CHANGE UP (ref 11.5–14.5)
SARS-COV-2 RNA SPEC QL NAA+PROBE: SIGNIFICANT CHANGE UP
SODIUM SERPL-SCNC: 143 MMOL/L — SIGNIFICANT CHANGE UP (ref 135–146)
SP GR SPEC: >1.05 (ref 1.01–1.03)
UROBILINOGEN FLD QL: SIGNIFICANT CHANGE UP
WBC # BLD: 6.47 K/UL — SIGNIFICANT CHANGE UP (ref 4.8–10.8)
WBC # FLD AUTO: 6.47 K/UL — SIGNIFICANT CHANGE UP (ref 4.8–10.8)

## 2021-06-29 PROCEDURE — 71045 X-RAY EXAM CHEST 1 VIEW: CPT | Mod: 26

## 2021-06-29 PROCEDURE — 99232 SBSQ HOSP IP/OBS MODERATE 35: CPT

## 2021-06-29 PROCEDURE — 93306 TTE W/DOPPLER COMPLETE: CPT | Mod: 26

## 2021-06-29 PROCEDURE — 93010 ELECTROCARDIOGRAM REPORT: CPT

## 2021-06-29 PROCEDURE — 70551 MRI BRAIN STEM W/O DYE: CPT | Mod: 26

## 2021-06-29 PROCEDURE — 99222 1ST HOSP IP/OBS MODERATE 55: CPT

## 2021-06-29 PROCEDURE — 70543 MRI ORBT/FAC/NCK W/O &W/DYE: CPT | Mod: 26

## 2021-06-29 RX ORDER — PANTOPRAZOLE SODIUM 20 MG/1
40 TABLET, DELAYED RELEASE ORAL
Refills: 0 | Status: DISCONTINUED | OUTPATIENT
Start: 2021-06-29 | End: 2021-07-01

## 2021-06-29 RX ORDER — CHLORHEXIDINE GLUCONATE 213 G/1000ML
1 SOLUTION TOPICAL DAILY
Refills: 0 | Status: DISCONTINUED | OUTPATIENT
Start: 2021-06-29 | End: 2021-07-01

## 2021-06-29 RX ORDER — SIMETHICONE 80 MG/1
80 TABLET, CHEWABLE ORAL ONCE
Refills: 0 | Status: COMPLETED | OUTPATIENT
Start: 2021-06-29 | End: 2021-06-29

## 2021-06-29 RX ORDER — LISINOPRIL 2.5 MG/1
20 TABLET ORAL DAILY
Refills: 0 | Status: DISCONTINUED | OUTPATIENT
Start: 2021-06-29 | End: 2021-07-01

## 2021-06-29 RX ADMIN — ALTEPLASE 516 MILLIGRAM(S): KIT at 00:18

## 2021-06-29 RX ADMIN — PANTOPRAZOLE SODIUM 40 MILLIGRAM(S): 20 TABLET, DELAYED RELEASE ORAL at 06:37

## 2021-06-29 RX ADMIN — SIMETHICONE 80 MILLIGRAM(S): 80 TABLET, CHEWABLE ORAL at 22:57

## 2021-06-29 RX ADMIN — ALTEPLASE 77.2 MILLIGRAM(S): KIT at 00:17

## 2021-06-29 RX ADMIN — LISINOPRIL 20 MILLIGRAM(S): 2.5 TABLET ORAL at 06:37

## 2021-06-29 NOTE — H&P ADULT - ATTENDING COMMENTS
events noted, sudden lost of vision l eye, was given tpa, no improvement, opht / Neuro f/up, repeat head CT, transfer to floor if cleared by neuro

## 2021-06-29 NOTE — ED PROVIDER NOTE - CARE PLAN
Principal Discharge DX:	Vision loss of left eye   Principal Discharge DX:	Vision loss of left eye  Goal:	concerning for CRAO

## 2021-06-29 NOTE — ED PROVIDER NOTE - ATTENDING CONTRIBUTION TO CARE
61yoM with h/o HTN presents with L eye visual loss, initially spots and then feeling as if a shutter was closing in on the vision, approx 1 hr PTA. Denies all other symptoms including eye pain or HA, v, dizziness, slurred speech, extrem weakness. No fall, ASA or anticoagulant, or recent surgery. On exam, afebrile, hemodynamically stable, saturating well, NAD, well appearing, sitting comfortably in bed, head NCAT, PERRL, EOMI, no conjunctival injection, globe soft, IOP 13, anicteric, MMM, no JVD, RRR, nml S1/S2, no m/r/g, lungs CTAB, no w/r/r, abd soft, NT, ND, nml BS, no rebound or guarding, AAO, CN's 3-12 intact, motor 5/5 and sens symm in all extrems, GREEN spontaneously, no leg cyanosis or edema, skin warm, well perfused, no rashes or hives. No e/o retinal detachment or vitreous hemorrhage on bedside US. No e/o glaucoma. Code stroke called on arrival and in consultation with Dr. Kennedy pt was consented to and given TPA. BP controlled without meds. Also high concern for CRAO. Ocular massage attempted. 61yoM with h/o HTN presents with L eye visual loss, initially spots and then feeling as if a shutter was closing in on the vision, approx 1 hr PTA. Denies all other symptoms including eye pain or HA, v, dizziness, slurred speech, extrem weakness. No fall, ASA or anticoagulant, or recent surgery. On exam, afebrile, hemodynamically stable, saturating well, NAD, well appearing, sitting comfortably in bed, head NCAT, PERRL, EOMI, no conjunctival injection, globe soft, IOP 13, anicteric, MMM, no JVD, RRR, nml S1/S2, no m/r/g, lungs CTAB, no w/r/r, abd soft, NT, ND, nml BS, no rebound or guarding, AAO, CN's 3-12 intact, motor 5/5 and sens symm in all extrems, GREEN spontaneously, no leg cyanosis or edema, skin warm, well perfused, no rashes or hives. No e/o retinal detachment or vitreous hemorrhage on bedside US. No e/o glaucoma. Code stroke called on arrival and in consultation with Dr. Kennedy pt was consented to and given TPA. BP controlled without meds. Also high concern for CRAO. Ocular massage attempted without change. Given TPA. D/w ophtho and no acute intervention/meds, see eye clinic tomorrow. Patient well appearing, hemodynamically stable. Given TPA. Admitted to internal medicine for further monitoring, w/u, and care.

## 2021-06-29 NOTE — PROGRESS NOTE ADULT - SUBJECTIVE AND OBJECTIVE BOX
admit to ICU for post tPA monitoring  -no antiplatelet as post tPA protocol  -please perform dysphagia screen now  -once dysphagia screen passed, start atorvastatin 80 mg once daily  -keep sBP < 180/105  -during infusion, neuro exams every 15 minutes  -check every 15 minutes for first hour after infusion is stopped; every 30 minutes for the next 6 hours, hourly until 24 hours from end of infusion  -repeat CT head with any change in mental status  -keep temp < 100 F and maintain glucose 140-180  -place order for "call MD if HR > 100 or < 55 or SaO2 < 95%"  -minimize arterial and venous punctures  -maintain strict bed rest for 24 hours with HOB < 30 degrees  -no antiplatelets, anticoagulation, heparin sq until 24 hours CT head negative for bleed  -labs:  HbA1c, lipids, TSH, troponins  -requires telemetry, TTE with bubble to rule out cardiac arrthymias  -repeat CT head non-contrast 24 hours post-TPA to rule out bleed - 06.30.2021 on 00:00  -also requires MRI head stroke protocol and ophthalmologic evaluation    Telestroke (06.28.2021 - 23:30)   (Initiated by Dr. En Ballard)    Pt came c/o sudden loss of vision in his left eye, pt was washing his hands and started seeing flashes of lights, denies any trauma, denies any numbness or tingling, no slurred speech .  eye vision change    Allergies    No Known Allergies    Home Medications:          MEDICATIONS:  alteplase    Bolus 8.6 milliGRAM(s) IV Bolus Once  alteplase    IVPB 77.2 milliGRAM(s) IV Intermittent Once    ICU Vital Signs Last 24 Hrs  T(C): 36.8 (28 Jun 2021 23:03), Max: 36.8 (28 Jun 2021 23:03)  T(F): 98.2 (28 Jun 2021 23:03), Max: 98.2 (28 Jun 2021 23:03)  HR: 71 (28 Jun 2021 23:20) (71 - 77)  BP: 182/105 (28 Jun 2021 23:20) (136/91 - 182/105)  RR: 18 (28 Jun 2021 23:20) (18 - 18)  SpO2: 95% (28 Jun 2021 23:20) (95% - 99%)    NIH Stroke Scale  NIH Stroke Scale: LOC 1a. Level of Consciousness: (0) Alert; keenly responsive  NIH Stroke Scale: LOC Question 1b. LOC Questions: (0) Answers both questions correctly  NIH Stroke Scale: LOC Command 1c. LOC Commands: (0) Performs both tasks correctly  NIH Stroke Scale: Gaze 2. Best Gaze: (0) Normal  NIH Stroke Scale: Visual 3. Visual: (1) Partial hemianopia  NIH Stroke Scale: Facial 4. Facial Palsy: (0) Normal symmetrical movements  NIH Stroke Scale: Arm Left 5a. Motor Arm, Left: (0) No drift; limb holds 90 (or 45) degrees for full 10 secs  NIH Stroke Scale: Arm Right 5b. Motor Arm, Right: (0) No drift; limb holds 90 (or 45) degrees for full 10 secs  NIH Stroke Scale: Leg Left 6a. Motor Leg, Left: (0) No drift; leg holds 30 degree position for full 5 secs  NIH Stroke Scale: Leg Right 6b. Motor Leg, Right: (0) No drift; leg holds 30 degree position for full 5 secs  NIH Stroke Scale: Ataxia 7. Limb Ataxia: (0) Absent  NIH Stroke Scale: Sensory 8. Sensory: (0) Normal; no sensory loss  NIH Stroke Scale: Language 9. Best Language: (0) No aphasia; normal  NIH Stroke Scale: Dysarthria 10. Dysarthria: (0) Normal  NIH Stroke Scale: Extinct Inattention 11. Extinction and Inattention (formerly Neglect): (0) No abnormality  NIH Stroke Scale: Total Total: 1         I/O's    LABS:                        14.5   7.06  )-----------( 180      ( 28 Jun 2021 23:23 )             40.1     06-28    143  |  106  |  16  ----------------------------<  106<H>  3.5   |  27  |  1.0    Ca    9.3      28 Jun 2021 23:23    TPro  7.0  /  Alb  4.4  /  TBili  0.8  /  DBili  x   /  AST  36  /  ALT  29  /  AlkPhos  86  06-28    PT/INR - ( 28 Jun 2021 23:23 )   PT: 12.50 sec;   INR: 1.09 ratio    PTT - ( 28 Jun 2021 23:23 )  PTT:35.3 sec    CARDIAC MARKERS ( 28 Jun 2021 23:23 )  x     / <0.01 ng/mL / x     / x     / x        CAPILLARY BLOOD GLUCOSE  POCT Blood Glucose.: 113 mg/dL (28 Jun 2021 23:22)           admit to ICU for post tPA monitoring  -no antiplatelet as post tPA protocol  -please perform dysphagia screen now  -once dysphagia screen passed, start atorvastatin 80 mg once daily  -keep sBP < 180/105  -during infusion, neuro exams every 15 minutes  -check every 15 minutes for first hour after infusion is stopped; every 30 minutes for the next 6 hours, hourly until 24 hours from end of infusion  -repeat CT head with any change in mental status  -keep temp < 100 F and maintain glucose 140-180  -place order for "call MD if HR > 100 or < 55 or SaO2 < 95%"  -minimize arterial and venous punctures  -maintain strict bed rest for 24 hours with HOB < 30 degrees  -no antiplatelets, anticoagulation, heparin sq until 24 hours CT head negative for bleed  -labs:  HbA1c, lipids, TSH, troponins  -requires telemetry, TTE with bubble to rule out cardiac arrthymias  -repeat CT head non-contrast 24 hours post-TPA to rule out bleed - 06.30.2021 on 00:00  -also requires MRI head stroke protocol and ophthalmologic evaluation    Telestroke (06.28.2021 - 23:30)   (Initiated by Dr. En Ballard)    61 yoM PHx HTN with sudden loss of vision of left eye (painless).  22:00, patient was washing his hands and started seeing flashes of light with stuttering course and tunnel vision.  Denies any trauma, denies any numbness or paresthesiae, no slurred speech.  ED /99.  95.3 kg.    PHx:  1.  HTN  2.  left superficial saphenous thrombophlebitis  3.  2013, 2019 epitaxis    Allergies    No Known Allergies    Home Medications:   ·	* Patient Currently Takes Medications as of 30-Jan-2020 21:58 documented in Structured Notes  ·	Keflex 500 mg oral capsule: 1 cap(s) orally 4 times a day   ·	meclizine 25 mg oral tablet: 1 tab(s) orally 3 times a day   ·	Percocet 5/325 oral tablet: 1 tab(s) orally every 8 hours MDD:3 tablets  ·	Bactrim  mg-160 mg oral tablet: 1 tab(s) orally 2 times a day   ·	Afrin 0.05% nasal spray: 2 spray(s) in the right nostril 2 times a day     CURRENT MEDICATIONS:  alteplase    Bolus 8.6 milliGRAM(s) IV Bolus Once/1 minute  alteplase    IVPB 77.2 milliGRAM(s) IV Intermittent Once/1 hour    ICU Vital Signs Last 24 Hrs  T(C): 36.8 (28 Jun 2021 23:03), Max: 36.8 (28 Jun 2021 23:03)  T(F): 98.2 (28 Jun 2021 23:03), Max: 98.2 (28 Jun 2021 23:03)  HR: 71 (28 Jun 2021 23:20) (71 - 77)  BP: 182/105 (28 Jun 2021 23:20) (136/91 - 182/105)  RR: 18 (28 Jun 2021 23:20) (18 - 18)  SpO2: 95% (28 Jun 2021 23:20) (95% - 99%)    NIH Stroke Scale  NIH Stroke Scale: LOC 1a. Level of Consciousness: (0) Alert; keenly responsive  NIH Stroke Scale: LOC Question 1b. LOC Questions: (0) Answers both questions correctly  NIH Stroke Scale: LOC Command 1c. LOC Commands: (0) Performs both tasks correctly  NIH Stroke Scale: Gaze 2. Best Gaze: (0) Normal  NIH Stroke Scale: Visual 3. Visual: (1) Partial hemianopia  NIH Stroke Scale: Facial 4. Facial Palsy: (0) Normal symmetrical movements  NIH Stroke Scale: Arm Left 5a. Motor Arm, Left: (0) No drift; limb holds 90 (or 45) degrees for full 10 secs  NIH Stroke Scale: Arm Right 5b. Motor Arm, Right: (0) No drift; limb holds 90 (or 45) degrees for full 10 secs  NIH Stroke Scale: Leg Left 6a. Motor Leg, Left: (0) No drift; leg holds 30 degree position for full 5 secs  NIH Stroke Scale: Leg Right 6b. Motor Leg, Right: (0) No drift; leg holds 30 degree position for full 5 secs  NIH Stroke Scale: Ataxia 7. Limb Ataxia: (0) Absent  NIH Stroke Scale: Sensory 8. Sensory: (0) Normal; no sensory loss  NIH Stroke Scale: Language 9. Best Language: (0) No aphasia; normal  NIH Stroke Scale: Dysarthria 10. Dysarthria: (0) Normal  NIH Stroke Scale: Extinct Inattention 11. Extinction and Inattention (formerly Neglect): (0) No abnormality  NIH Stroke Scale: Total Total: 1     I/O's    LABS:                        14.5   7.06  )-----------( 180      ( 28 Jun 2021 23:23 )             40.1     06-28    143  |  106  |  16  ----------------------------<  106<H>  3.5   |  27  |  1.0    Ca    9.3      28 Jun 2021 23:23    TPro  7.0  /  Alb  4.4  /  TBili  0.8  /  DBili  x   /  AST  36  /  ALT  29  /  AlkPhos  86  06-28    PT/INR - ( 28 Jun 2021 23:23 )   PT: 12.50 sec;   INR: 1.09 ratio    PTT - ( 28 Jun 2021 23:23 )  PTT:35.3 sec    CARDIAC MARKERS ( 28 Jun 2021 23:23 )  x     / <0.01 ng/mL / x     / x     / x        CAPILLARY BLOOD GLUCOSE  POCT Blood Glucose.: 113 mg/dL (28 Jun 2021 23:22)    CT/CTA/CTP:  1.  No evidence of acute intracranial hemorrhage or acute territorial infarct.  2.  CTA head and neck:  No evidence of major vascular stenosis or occlusion.  3.  CT perfusion:  No core infarct.    Recommendations:    (1) Suspected left central retinal artery occlusion.  Patient does not have any exclusion criteria for tPA.     admit to ICU for post tPA monitoring  -no antiplatelet as post tPA protocol  -please perform dysphagia screen now  -once dysphagia screen passed, start atorvastatin 80 mg once daily  -keep sBP < 180/105 (given Hx of epitaxis)  -during infusion, neuro exams every 15 minutes  -check every 15 minutes for first hour after infusion is stopped; every 30 minutes for the next 6 hours, hourly until 24 hours from end of infusion  -repeat CT head with any change in mental status  -keep temp < 100 F and maintain glucose 140-180  -place order for "call MD if HR > 100 or < 55 or SaO2 < 95%"  -minimize arterial and venous punctures  -maintain strict bed rest for 24 hours with HOB < 30 degrees  -no antiplatelets, anticoagulation, heparin sq until 24 hours CT head negative for bleed  -labs:  HbA1c, lipids, TSH, troponins  -requires telemetry, TTE with bubble to rule out cardiac arrthymias  -repeat CT head non-contrast 24 hours post-TPA to rule out bleed - 06.30.2021 on 00:00  -also requires MRI head stroke protocol and ophthalmologic evaluation    Thank you for referral.  Kevin Kennedy MD,  [facility]  [phone]    Telestroke (06.28.2021 - 23:30)   (Initiated by Dr. En Ballard)    61 yoM PHx HTN with sudden loss of vision of left eye (painless).  22:00, patient was washing his hands and started seeing flashes of light with stuttering course and tunnel vision.  Denies any trauma, denies any numbness or paresthesiae, no slurred speech.  ED /99.  95.3 kg.    PHx:  1.  HTN  2.  left superficial saphenous thrombophlebitis  3.  2013, 2019 epitaxis    Allergies    No Known Allergies    Home Medications:   ·	* Patient Currently Takes Medications as of 30-Jan-2020 21:58 documented in Structured Notes  ·	Keflex 500 mg oral capsule: 1 cap(s) orally 4 times a day   ·	meclizine 25 mg oral tablet: 1 tab(s) orally 3 times a day   ·	Percocet 5/325 oral tablet: 1 tab(s) orally every 8 hours MDD:3 tablets  ·	Bactrim  mg-160 mg oral tablet: 1 tab(s) orally 2 times a day   ·	Afrin 0.05% nasal spray: 2 spray(s) in the right nostril 2 times a day     CURRENT MEDICATIONS:  alteplase    Bolus 8.6 milliGRAM(s) IV Bolus Once/1 minute - given 06.29.2021 @ 00:00  alteplase    IVPB 77.2 milliGRAM(s) IV Intermittent Once/1 hour    ICU Vital Signs Last 24 Hrs  T(C): 36.8 (28 Jun 2021 23:03), Max: 36.8 (28 Jun 2021 23:03)  T(F): 98.2 (28 Jun 2021 23:03), Max: 98.2 (28 Jun 2021 23:03)  HR: 71 (28 Jun 2021 23:20) (71 - 77)  BP: 182/105 (28 Jun 2021 23:20) (136/91 - 182/105)  RR: 18 (28 Jun 2021 23:20) (18 - 18)  SpO2: 95% (28 Jun 2021 23:20) (95% - 99%)    NIH Stroke Scale  NIH Stroke Scale: LOC 1a. Level of Consciousness: (0) Alert; keenly responsive  NIH Stroke Scale: LOC Question 1b. LOC Questions: (0) Answers both questions correctly  NIH Stroke Scale: LOC Command 1c. LOC Commands: (0) Performs both tasks correctly  NIH Stroke Scale: Gaze 2. Best Gaze: (0) Normal  NIH Stroke Scale: Visual 3. Visual: (1) Partial hemianopia  NIH Stroke Scale: Facial 4. Facial Palsy: (0) Normal symmetrical movements  NIH Stroke Scale: Arm Left 5a. Motor Arm, Left: (0) No drift; limb holds 90 (or 45) degrees for full 10 secs  NIH Stroke Scale: Arm Right 5b. Motor Arm, Right: (0) No drift; limb holds 90 (or 45) degrees for full 10 secs  NIH Stroke Scale: Leg Left 6a. Motor Leg, Left: (0) No drift; leg holds 30 degree position for full 5 secs  NIH Stroke Scale: Leg Right 6b. Motor Leg, Right: (0) No drift; leg holds 30 degree position for full 5 secs  NIH Stroke Scale: Ataxia 7. Limb Ataxia: (0) Absent  NIH Stroke Scale: Sensory 8. Sensory: (0) Normal; no sensory loss  NIH Stroke Scale: Language 9. Best Language: (0) No aphasia; normal  NIH Stroke Scale: Dysarthria 10. Dysarthria: (0) Normal  NIH Stroke Scale: Extinct Inattention 11. Extinction and Inattention (formerly Neglect): (0) No abnormality  NIH Stroke Scale: Total Total: 1     I/O's    LABS:                        14.5   7.06  )-----------( 180      ( 28 Jun 2021 23:23 )             40.1     06-28    143  |  106  |  16  ----------------------------<  106<H>  3.5   |  27  |  1.0    Ca    9.3      28 Jun 2021 23:23    TPro  7.0  /  Alb  4.4  /  TBili  0.8  /  DBili  x   /  AST  36  /  ALT  29  /  AlkPhos  86  06-28    PT/INR - ( 28 Jun 2021 23:23 )   PT: 12.50 sec;   INR: 1.09 ratio    PTT - ( 28 Jun 2021 23:23 )  PTT:35.3 sec    CARDIAC MARKERS ( 28 Jun 2021 23:23 )  x     / <0.01 ng/mL / x     / x     / x        CAPILLARY BLOOD GLUCOSE  POCT Blood Glucose.: 113 mg/dL (28 Jun 2021 23:22)    CT/CTA/CTP:  1.  No evidence of acute intracranial hemorrhage or acute territorial infarct.  2.  CTA head and neck:  No evidence of major vascular stenosis or occlusion.  3.  CT perfusion:  No core infarct.    Recommendations:    (1) Suspected left central retinal artery occlusion.  Patient does not have any exclusion criteria for tPA.     admit to ICU for post tPA monitoring  -no antiplatelet as post tPA protocol  -please perform dysphagia screen now  -once dysphagia screen passed, start atorvastatin 80 mg once daily  -keep sBP < 180/105 (given Hx of epitaxis)  -during infusion, neuro exams every 15 minutes  -check every 15 minutes for first hour after infusion is stopped; every 30 minutes for the next 6 hours, hourly until 24 hours from end of infusion  -repeat CT head with any change in mental status  -keep temp < 100 F and maintain glucose 140-180  -place order for "call MD if HR > 100 or < 55 or SaO2 < 95%"  -minimize arterial and venous punctures  -maintain strict bed rest for 24 hours with HOB < 30 degrees  -no antiplatelets, anticoagulation, heparin sq until 24 hours CT head negative for bleed  -labs:  HbA1c, lipids, TSH, troponins  -requires telemetry, TTE with bubble to rule out cardiac arrthymias  -repeat CT head non-contrast 24 hours post-TPA to rule out bleed - 06.30.2021 on 00:00  -also requires MRI head stroke protocol and ophthalmologic evaluation    Thank you for referral.  Kevin Kennedy MD,  [facility]  [phone]    Telestroke (06.28.2021 - 23:30)   (Initiated by Dr. En Ballard)    61yoM with h/o HTN presents with L eye visual loss, initially spots and then feeling as if a shutter was closing in on the vision, approx 1 hr PTA. Denies all other symptoms including eye pain or HA, v, dizziness, slurred speech, extrem weakness. No fall, ASA or anticoagulant, or recent surgery.  PERRL, EOMI, no conjunctival injection, globe soft, IOP 13 normal, no e/o retinal detachment or vitreous hemorrhage on bedside US. No e/o glaucoma. BP controlled without meds. Suspected left central retinal artery occlusion. Ocular massage attempted  ED /99.  95.3 kg.    PHx:  1.  HTN  2.  left superficial saphenous thrombophlebitis  3.  2013, 2019 epitaxis    Allergies    No Known Allergies    Home Medications:   ·	* Patient Currently Takes Medications as of 30-Jan-2020 21:58 documented in Structured Notes  ·	Keflex 500 mg oral capsule: 1 cap(s) orally 4 times a day   ·	meclizine 25 mg oral tablet: 1 tab(s) orally 3 times a day   ·	Percocet 5/325 oral tablet: 1 tab(s) orally every 8 hours MDD:3 tablets  ·	Bactrim  mg-160 mg oral tablet: 1 tab(s) orally 2 times a day   ·	Afrin 0.05% nasal spray: 2 spray(s) in the right nostril 2 times a day     CURRENT MEDICATIONS:  alteplase    Bolus 8.6 milliGRAM(s) IV Bolus Once/1 minute - given 06.29.2021 @ 00:00  alteplase    IVPB 77.2 milliGRAM(s) IV Intermittent Once/1 hour    ICU Vital Signs Last 24 Hrs  T(C): 36.8 (28 Jun 2021 23:03), Max: 36.8 (28 Jun 2021 23:03)  T(F): 98.2 (28 Jun 2021 23:03), Max: 98.2 (28 Jun 2021 23:03)  HR: 71 (28 Jun 2021 23:20) (71 - 77)  BP: 182/105 (28 Jun 2021 23:20) (136/91 - 182/105)  RR: 18 (28 Jun 2021 23:20) (18 - 18)  SpO2: 95% (28 Jun 2021 23:20) (95% - 99%)    Dr. Ballard:  PERRL, EOMI, no conjunctival injection, globe soft, IOP 13, anicteric, MMM, no JVD, RRR, nml S1/S2, no m/r/g, lungs CTAB, no w/r/r, abd soft, NT, ND, nml BS, no rebound or guarding, AAO, CN's 3-12 intact, motor 5/5 and sens symm in all extrems, GREEN spontaneously, no leg cyanosis or edema, skin warm, well perfused, no rashes or hives. No e/o retinal detachment or vitreous hemorrhage on bedside US. No e/o glaucoma.    NIH Stroke Scale  NIH Stroke Scale: LOC 1a. Level of Consciousness: (0) Alert; keenly responsive  NIH Stroke Scale: LOC Question 1b. LOC Questions: (0) Answers both questions correctly  NIH Stroke Scale: LOC Command 1c. LOC Commands: (0) Performs both tasks correctly  NIH Stroke Scale: Gaze 2. Best Gaze: (0) Normal  NIH Stroke Scale: Visual 3. Visual: (1) Partial hemianopia  NIH Stroke Scale: Facial 4. Facial Palsy: (0) Normal symmetrical movements  NIH Stroke Scale: Arm Left 5a. Motor Arm, Left: (0) No drift; limb holds 90 (or 45) degrees for full 10 secs  NIH Stroke Scale: Arm Right 5b. Motor Arm, Right: (0) No drift; limb holds 90 (or 45) degrees for full 10 secs  NIH Stroke Scale: Leg Left 6a. Motor Leg, Left: (0) No drift; leg holds 30 degree position for full 5 secs  NIH Stroke Scale: Leg Right 6b. Motor Leg, Right: (0) No drift; leg holds 30 degree position for full 5 secs  NIH Stroke Scale: Ataxia 7. Limb Ataxia: (0) Absent  NIH Stroke Scale: Sensory 8. Sensory: (0) Normal; no sensory loss  NIH Stroke Scale: Language 9. Best Language: (0) No aphasia; normal  NIH Stroke Scale: Dysarthria 10. Dysarthria: (0) Normal  NIH Stroke Scale: Extinct Inattention 11. Extinction and Inattention (formerly Neglect): (0) No abnormality  NIH Stroke Scale: Total Total: 1     I/O's    LABS:                        14.5   7.06  )-----------( 180      ( 28 Jun 2021 23:23 )             40.1     06-28    143  |  106  |  16  ----------------------------<  106<H>  3.5   |  27  |  1.0    Ca    9.3      28 Jun 2021 23:23    TPro  7.0  /  Alb  4.4  /  TBili  0.8  /  DBili  x   /  AST  36  /  ALT  29  /  AlkPhos  86  06-28    PT/INR - ( 28 Jun 2021 23:23 )   PT: 12.50 sec;   INR: 1.09 ratio    PTT - ( 28 Jun 2021 23:23 )  PTT:35.3 sec    CARDIAC MARKERS ( 28 Jun 2021 23:23 )  x     / <0.01 ng/mL / x     / x     / x        CAPILLARY BLOOD GLUCOSE  POCT Blood Glucose.: 113 mg/dL (28 Jun 2021 23:22)    CT/CTA/CTP:  1.  No evidence of acute intracranial hemorrhage or acute territorial infarct.  2.  CTA head and neck:  No evidence of major vascular stenosis or occlusion.  3.  CT perfusion:  No core infarct.    Recommendations:    (1) Suspected left central retinal artery occlusion.  Patient does not have any exclusion criterion for tPA.     admit to ICU for post tPA monitoring  -no antiplatelet as post tPA protocol  -please perform dysphagia screen now  -once dysphagia screen passed, start atorvastatin 80 mg once daily  -keep sBP < 180/105 (given Hx of epitaxis)  -during infusion, neuro exams every 15 minutes  -check every 15 minutes for first hour after infusion is stopped; every 30 minutes for the next 6 hours, hourly until 24 hours from end of infusion  -repeat CT head with any change in mental status  -keep temp < 100 F and maintain glucose 140-180  -place order for "call MD if HR > 100 or < 55 or SaO2 < 95%"  -minimize arterial and venous punctures  -maintain strict bed rest for 24 hours with HOB < 30 degrees  -no antiplatelets, anticoagulation, heparin sq until 24 hours CT head negative for bleed  -labs:  HbA1c, lipids, TSH, troponins    -requires telemetry, TTE with bubble  -repeat CT head non-contrast 24 hours post-TPA to rule out bleed - 06.30.2021 on 00:00  -MRI head stroke protocol  -ophthalmologic evaluation    Thank you for referral.  Kevin Kennedy MD,  [facility]  [phone]

## 2021-06-29 NOTE — ED PROVIDER NOTE - NS ED ROS FT
Review of Systems:  CONSTITUTIONAL: No fever, No diaphoresis  SKIN: No rash  HEMATOLOGIC: No abnormal bleeding or bruising  EYES: No eye pain, + blurred vision  ENT: No change in hearing, No sore throat, No neck pain, No rhinorrhea, No ear pain  RESPIRATORY: No shortness of breath, No cough  CARDIAC: No chest pain, No palpitations  GI: No abdominal pain, No nausea, No vomiting, No diarrhea  MUSCULOSKELETAL: No joint paint, No swelling, No back pain  NEUROLOGIC: No numbness, No focal weakness, No headache, No dizziness  All other systems negative, unless specified in HPI

## 2021-06-29 NOTE — PATIENT PROFILE ADULT - HISTORY OF COVID-19 VACCINATION
Patient states she had an appointment yesterday 12/22/20 and has been receiving messages from [facility] that her prescription is ready. Patient states she is not sure which prescription it is for and it was not sent to the patient's preferred pharmacy. Patient is looking for confirmation on which prescription she is receiving and if it could be sent to the attached pharmacy instead. Please reach out to patient to discuss.    No

## 2021-06-29 NOTE — H&P ADULT - ASSESSMENT
61 year old male patient, known hypertensive, non-smoker, no past surgical history, presented for acute loss of vision in left eye.     # Acute vision loss   - Likely etiology central retinal artery occlusion?  - Could not visualize pallor in retinal disk on opthalmoscope  - (06.28.21 @ 23:56) > CTA head and neck: No evidence of major vascular stenosis or occlusion. CT perfusion: No core infarct.  - IOP measured by ED team > IOP normal, US does not show evidence of detachment or hemorrhage; 13 mmgH on L and 9 mmHg on R; 20/30 OD and no visual acuity on OS; no detachment or hemorrhage on bedside ocular US.  - Opthalmo reached by ED team, Resident Otilia, no intervention at current time. they do not perform local thrombolysis, recommend esr, crp, MALINA, SPEP, UPEP, MR brain and orbits, and to be seen in eye clinic in the morning.  - Received systematic TPA.     # HTN  - Lisinopril 20mg qd    # Diet > DASH  # DVT > Just received TPA IMPRESSION  Acute onset vision loss in left eye R/O Central retinal artery occlusion s/p TPA ; s/p CTA head and neck  H/O HTN    PLAN      CNS: Neurology Follow up . Repeat CTH 24 Hours post  tpa . Opthalmology consult     HEENT: Oral care    PULMONARY:  HOB @ 45 degrees. Aspiration Precautions . CXR    CARDIOVASCULAR: Avoid volume overload . Keep I=O. ECHO with bubble study. BP control    GI: GI prophylaxis.  Feeding as tolerated     RENAL:  Follow up lytes.  Correct as needed     INFECTIOUS DISEASE: Follow up cultures. No abx for now    HEMATOLOGICAL:  DVT prophylaxis. B/L LE duplex .     ENDOCRINE:  Follow up FS.  Insulin protocol if needed.    MUSCULOSKELETAL: OOB to chair. B/L LE duplex    Buckner: No  Lines: Peripheral IV  Code status: Full code  Disposition: MICU            IMPRESSION  Acute onset vision loss in left eye R/O retinal disease/ ? stroike s/p TPA ; s/p CTA head and neck no improvement  H/O HTN    PLAN      CNS: Neurology Follow up . Repeat CTH 24 Hours post  tpa . Opthalmology consult     HEENT: Oral care    PULMONARY:  HOB @ 45 degrees. Aspiration Precautions . CXR    CARDIOVASCULAR: Avoid volume overload . Keep I=O. ECHO with bubble study. BP control    GI: GI prophylaxis.  Feeding as tolerated     RENAL:  Follow up lytes.  Correct as needed     INFECTIOUS DISEASE: Follow up cultures. No abx for now    HEMATOLOGICAL:  DVT prophylaxis. B/L LE duplex .     ENDOCRINE:  Follow up FS.  Insulin protocol if needed.    MUSCULOSKELETAL: OOB to chair. B/L LE duplex    Buckner: No  Lines: Peripheral IV  Code status: Full code  Disposition: MICU

## 2021-06-29 NOTE — H&P ADULT - HISTORY OF PRESENT ILLNESS
61 year old male patient, known hypertensive, non-smoker, no past surgical history, presented for acute loss of vision in left eye.     Current history goes back to 10:15 PM of the night of admission when the patient after finishing working in his yard, was washing his hands and then experienced acute onset of loss of vision in his left eye. Patient states he felt peripheral vision loss closing in, only able to see centrally, painless. No previous episodes. Not on A/C or aspirin.  Pt denies any headaches, dizziness, fevers, chills, dysarthria, focal weakness, falls.  He presented to the ED, patient was given systematic TPA with no change in vision. Admitted to ICU for monitoring.

## 2021-06-29 NOTE — ED PROVIDER NOTE - OBJECTIVE STATEMENT
60 yo M with PMH HTN who presents with sudden onset painless L eye vision loss 1 hour TPA, around 10:15 PM.  Pt states he felt peripheral vision loss closing in, only able to see centrally, painless. No previous episodes. Not on A/C or aspirin.  Pt denies any headaches, dizziness, fevers, chills, dysarthria, focal weakness, falls. 60 yo M with PMH HTN who presents with sudden onset painless L eye vision loss 1 hour PTA, around 10:15 PM.  Pt states he felt peripheral vision loss closing in, only able to see centrally, painless. No previous episodes. Not on A/C or aspirin.  Pt denies any headaches, dizziness, fevers, chills, dysarthria, focal weakness, falls.

## 2021-06-29 NOTE — ED PROVIDER NOTE - PHYSICAL EXAMINATION
CONSTITUTIONAL: Well-developed; well-nourished; in no acute distress  SKIN: Warm, dry  HEAD: Normocephalic; atraumatic  EYES: No conjunctival injection. PERRLA. EOMI; 13 mmgH on L and 9 mmHg on R; 20/30 OD and no visual acuity on OS; no detachment or hemorrhage on bedside ocular US  ENT: No nasal discharge; oropharynx nonerythematous; airway clear  NECK: Supple; non tender  CARD:  Regular rate and rhythm  RESP: CTAB  ABD: Soft NTND; No guarding or rebound tenderness  EXT: Normal ROM. No edema  NEURO: A&O x3, grossly unremarkable, no focal deficits  PSYCH: Cooperative, appropriate

## 2021-06-29 NOTE — ED PROVIDER NOTE - CLINICAL SUMMARY MEDICAL DECISION MAKING FREE TEXT BOX
61yoM with h/o HTN presents with L eye visual loss, initially spots and then feeling as if a shutter was closing in on the vision, approx 1 hr PTA. Denies all other symptoms including eye pain or HA, v, dizziness, slurred speech, extrem weakness. No fall, ASA or anticoagulant, or recent surgery. On exam, afebrile, hemodynamically stable, saturating well, NAD, well appearing, sitting comfortably in bed, head NCAT, PERRL, EOMI, no conjunctival injection, globe soft, IOP 13, anicteric, MMM, no JVD, RRR, nml S1/S2, no m/r/g, lungs CTAB, no w/r/r, abd soft, NT, ND, nml BS, no rebound or guarding, AAO, CN's 3-12 intact, motor 5/5 and sens symm in all extrems, GREEN spontaneously, no leg cyanosis or edema, skin warm, well perfused, no rashes or hives. No e/o retinal detachment or vitreous hemorrhage on bedside US. No e/o glaucoma. Code stroke called on arrival and in consultation with Dr. Kennedy pt was consented to and given TPA. BP controlled without meds. Also high concern for CRAO. Ocular massage attempted without change. Given TPA. D/w ophtho and no acute intervention/meds, see eye clinic tomorrow. Patient well appearing, hemodynamically stable. Given TPA. Admitted to internal medicine for further monitoring, w/u, and care.

## 2021-06-29 NOTE — PROGRESS NOTE ADULT - ATTENDING COMMENTS
61 year old gentleman with acute onset of L mono occular visual impairment, received IV rtPA. O/E has restricted vision in left inferior and temporal quadrants of left eye otherwise non focal. Continue with post IV tPA care and monitoring. Suggest ophtalmology consult.

## 2021-06-29 NOTE — ED PROVIDER NOTE - PROGRESS NOTE DETAILS
AH - Spoke to Neurology, Noram, agrees with tPA; IOP normal, US does not show evidence of detachment or hemorrhage AH - Spoke to Neurology, Norma, agrees with tPA; IOP normal, US does not show evidence of detachment or hemorrhage;  13 mmgH on L and 9 mmHg on R; 20/30 OD and no visual acuity on OS; no detachment or hemorrhage on bedside ocular US CANDY - pt receiving tPA currently; spoke to ICU, Bruna, approved for ICU under [facility] AH - Spoke to Neurology, Norma, agrees with tPA; CT Head neg, CT perfusion neg;   IOP normal, US does not show evidence of detachment or hemorrhage;  13 mmgH on L and 9 mmHg on R; 20/30 OD and no visual acuity on OS; no detachment or hemorrhage on bedside ocular US;  Ophtho consulted, spoke to Resident Otilia D/w Dr. Bingham, ophtho on call, who states there is no acute intervention at this time, they do not perform local thrombolysis, recommend esr, crp, MALINA, SPEP, UPEP, MR brain and orbits, and to be seen in eye clinic in the morning

## 2021-06-29 NOTE — H&P ADULT - NSHPLABSRESULTS_GEN_ALL_CORE
Script not queued correctly so it was not signed-off by Dr Juan Murdock    Request queued and forwarded to Dr Juan Murdock for approval  LABS:                        14.5   7.06  )-----------( 180      ( 28 Jun 2021 23:23 )             40.1     06-28    143  |  106  |  16  ----------------------------<  106<H>  3.5   |  27  |  1.0    Ca    9.3      28 Jun 2021 23:23    TPro  7.0  /  Alb  4.4  /  TBili  0.8  /  DBili  x   /  AST  36  /  ALT  29  /  AlkPhos  86  06-28    PT/INR - ( 28 Jun 2021 23:23 )   PT: 12.50 sec;   INR: 1.09 ratio         PTT - ( 28 Jun 2021 23:23 )  PTT:35.3 sec  Bilirubin: Negative (06-29-21 @ 00:00)  Aspartate Aminotransferase (AST/SGOT): 36 U/L (06-28-21 @ 23:23)  Alanine Aminotransferase (ALT/SGPT): 29 U/L (06-28-21 @ 23:23)

## 2021-06-29 NOTE — H&P ADULT - NSHPPHYSICALEXAM_GEN_ALL_CORE
General: A/ox 3, No acute Distress  Neck: Supple, NO JVD  Cardiac: S1 S2 heard regular, No audible murmurs  Pulmonary: Good bilateral breath sounds, Breathing unlabored, No Rhonchi/Rales/Wheezing  Abdomen: Soft, Non -tender, +BS   Extremities: No Rashes, No edema  Neuro: A/o x 3, No focal deficits  Psch: normal mood , normal affect    T(C): 36.8 (06-29-21 @ 01:02), Max: 36.8 (06-28-21 @ 23:03)  HR: 78 (06-29-21 @ 01:02) (65 - 78)  BP: 142/80 (06-29-21 @ 01:02) (136/91 - 182/105)  RR: 18 (06-29-21 @ 01:02) (18 - 18)  SpO2: 96% (06-29-21 @ 01:02) (95% - 99%) General: A/ox 3, No acute Distress  Neck: Supple, NO JVD  Cardiac: S1 S2 heard regular, No audible murmurs  Pulmonary: Good bilateral breath sounds, Breathing unlabored, No Rhonchi/Rales/Wheezing  Abdomen: Soft, Non -tender, +BS   Extremities: No Rashes, No edema  Neuro: A/o x 3, No focal deficits, l eye dec vision mainly peripheral  Psch: normal mood , normal affect    T(C): 36.8 (06-29-21 @ 01:02), Max: 36.8 (06-28-21 @ 23:03)  HR: 78 (06-29-21 @ 01:02) (65 - 78)  BP: 142/80 (06-29-21 @ 01:02) (136/91 - 182/105)  RR: 18 (06-29-21 @ 01:02) (18 - 18)  SpO2: 96% (06-29-21 @ 01:02) (95% - 99%)

## 2021-06-29 NOTE — PROGRESS NOTE ADULT - SUBJECTIVE AND OBJECTIVE BOX
Progress Note  This morning patient was seen and examined at bedside.    Today is hospital day .  Mr. Henry is doing fine.   He reports he had a good night sleep.   He still reports reduced vision in left eye but denies eye pain or discharge.  His appetite is adequate, and he denies nausea or vomiting.   He denies any abdominal pain, diarrhea, or constipation.   His last bowel movement was soft and was on 06/28.   He denies any shortness of breath, chest pain, palpitations, or light headedness.   He is voiding freely and denies urinary symptoms (dysuria, urgency, frequency, intermittence).      Vital Signs in the last 24 hours   Vitals Summary T(C): 35.6 (06-29-21 @ 08:00), Max: 36.9 (06-29-21 @ 02:17)  HR: 52 (06-29-21 @ 16:08) (52 - 78)  BP: 132/84 (06-29-21 @ 16:08) (121/72 - 182/105)  RR: 15 (06-29-21 @ 16:08) (11 - 25)  SpO2: 97% (06-29-21 @ 16:08) (95% - 99%)  Vent Data   Intake/ Output   06-28-21 @ 07:01  -  06-29-21 @ 07:00  --------------------------------------------------------  IN: 180 mL / OUT: 250 mL / NET: -70 mL    06-29-21 @ 07:01  -  06-29-21 @ 16:51  --------------------------------------------------------  IN: 0 mL / OUT: 600 mL / NET: -600 mL      Physical Exam  * General Appearance: Alert, cooperative, interactive, well-groomed, oriented to time, place, and person, in no acute distress  * Head: Left pupillary dilation, reduced reactivity to light, decreased left eye peripheral visual field, no nystagmus, Normocephalic, without obvious abnormality, atraumatic  * Ears: Normal TM's and external ear canals bilaterally  * Nose: Nares normal, septum midline, mucosa normal, no drainage or sinus tenderness  * Throat: Lips, mucosa, and tongue normal; teeth and gums normal  * Neck: Supple, symmetrical, trachea midline, no adenopathy;   * Thyroid:  No enlargement/tenderness/nodules; no carotid bruit or JVD  * Lungs: Respirations unlabored, Good bilateral air entry, normal breath sounds (Clear to auscultation bilaterally, no audible wheezes, crackles, or rhonchi)  * Heart: Regular Rate and Rhythm, normal S1 and S2, no audible murmur, rub, or gallop  * Abdomen: Symmetric, non-distended, no scar, soft, non-tender, bowel sounds active all four quadrants, no masses, no organomegaly (no hepatosplenomegaly)  * Extremities: Extremities normal, atraumatic, no cyanosis, no lower extremity pitting edema bilaterally, adequate dorsalis pedis pulses  * Pulses: 2+ and symmetric all extremities  * Neurologic: Left pupillary dilation, reduced reactivity to light, decreased left eye peripheral visual field, no nystagmus      Investigations   Laboratory Workup  - CBC:                        13.5   6.47  )-----------( 150      ( 29 Jun 2021 05:19 )             38.6     - Chemistry:  06-29    143  |  107  |  15  ----------------------------<  110<H>  3.9   |  30  |  0.9    Ca    8.8      29 Jun 2021 05:19  Mg     2.1     06-29    TPro  6.0  /  Alb  4.0  /  TBili  1.0  /  DBili  x   /  AST  30  /  ALT  27  /  AlkPhos  72  06-29    - Coagulation Studies:  PT/INR - ( 28 Jun 2021 23:23 )   PT: 12.50 sec;   INR: 1.09 ratio         PTT - ( 28 Jun 2021 23:23 )  PTT:35.3 sec  - ABG:    - Cardiac Markers:  CARDIAC MARKERS ( 28 Jun 2021 23:23 )  x     / <0.01 ng/mL / x     / x     / x          Microbiological Workup  Urinalysis Basic - ( 29 Jun 2021 00:00 )    Color: Light Yellow / Appearance: Clear / SG: >1.050 / pH: x  Gluc: x / Ketone: Negative  / Bili: Negative / Urobili: <2 mg/dL   Blood: x / Protein: Trace / Nitrite: Negative   Leuk Esterase: Negative / RBC: x / WBC x   Sq Epi: x / Non Sq Epi: x / Bacteria: x      Current Medications  Standing Medications  chlorhexidine 4% Liquid 1 Application(s) Topical daily  lisinopril 20 milliGRAM(s) Oral daily  pantoprazole    Tablet 40 milliGRAM(s) Oral before breakfast    PRN Medications    Singles Doses Administered  ([place]) 1 each &lt;see task&gt; GiveOnce  alteplase    Bolus 8.6 milliGRAM(s) IV Bolus Once  alteplase    IVPB 77.2 milliGRAM(s) IV Intermittent Once

## 2021-06-29 NOTE — PROGRESS NOTE ADULT - ASSESSMENT
Assessment and Plan      Suspected Left Central Retinal Artery Occlusion  * Presentation to ED 06/28 PM acute loss of left sided peripheral visual field  * CT brain stroke protocol (06/28) No evidence of acute intracranial hemorrhage or acute territorial infarct.  * Stroke Code was called in ED s/p TPA at 06/29 at 00:00 AM    - Follow up ophthalmology consult recommendations  -         Others  - DVT Prophylaxis: Lovenox 40mg Subcutaneously daily  - GI Prophylaxis: Pantoprazole 40mg PO QD  - IV Hydration with D5 0.45 NSS at a rate of 20cc/hour   - Diet: Regular  - Code Status: Full    Barriers to learning: YES/NO  Discharge Planning: Patient will be discharged once stable and  Plan was communicated with   Education given to:   Educated about:       Zana Marques MD  PGY - 1 Internal Medicine   [facility]   Assessment and Plan  Case of a 61 year old male patient with history of HTN who presented on 06/28 following acute loss of left sided peripheral visual field, s/p stroke code in ED s/p negative imaging of the head s/p TPA on 06/29 at 00:00 AM, admitted to MICU for post-TPA monitoring and for ophthalmological evaluation for suspected left central retinal artery occlusion. Currently hemodynamically stable.      Suspected Left Central Retinal Artery Occlusion  * Presentation to ED 06/28 PM acute loss of left sided peripheral visual field  * CT brain stroke protocol (06/28) No evidence of acute intracranial hemorrhage or acute territorial infarct.  * Stroke Code was called in ED s/p TPA at 06/29 at 00:00 AM    - Neurology Team on board for post-TPA monitoring  - Repeat CT H without contrast on 06/30 at 00:00 AM to rule out bleed 24 hours post TPA  - Follow up MRI head wo and orbits with and wo IC (06/29)  - Follow up ophthalmology consult recommendations  - Monitor BP closely  - Keep off antiplatelets and AC  - Follow up lipid profile, Hba1c, and TSH ordered for 06/30  - Monitor telemetry  - TTE with bubble (06/29) EF 61%, DD I, no septal defects mentioned  - Check CRP (06/29) received and ESR (06/29) 9      HTN  * Home meds HCTZ 25mg QD and Quinapril 10mg BID  - Monitor BP closely  - Continue Lisinopril 20mg QD      Others  - DVT Prophylaxis: Off Anticoagulation for now  - GI Prophylaxis: Pantoprazole 40mg PO QD  - Diet: DASH/ TLC  - Code Status: Full    Barriers to learning: YES/NO  Discharge Planning: Patient will be discharged once stable and  Plan was communicated with   Education given to:   Educated about:       Zana Marques MD  PGY - 1 Internal Medicine   [facility]

## 2021-06-29 NOTE — PROGRESS NOTE ADULT - SUBJECTIVE AND OBJECTIVE BOX
HPI:  61 year old male patient, known hypertensive, non-smoker, no past surgical history, presented for acute loss of vision in left eye.     Current history goes back to 10:15 PM of the night of admission when the patient after finishing working in his yard, was washing his hands and then experienced acute onset of loss of vision in his left eye. Patient states he felt peripheral vision loss closing in, only able to see centrally, painless. No previous episodes. Not on A/C or aspirin.  Pt denies any headaches, dizziness, fevers, chills, dysarthria, focal weakness, falls.  He presented to the ED, patient was given systematic TPA with no change in vision. Admitted to ICU for monitoring.  (29 Jun 2021 01:09)    Overnight Events:  T(C): 35.6 (06-29-21 @ 08:00), Max: 36.9 (06-29-21 @ 02:17)  HR: 62 (06-29-21 @ 10:00) (52 - 78)  BP: 128/77 (06-29-21 @ 10:00) (124/71 - 182/105)  BP(mean): 91 (06-29-21 @ 10:00) (91 - 123)  ABP: --  ABP(mean): --  RR: 18 (06-29-21 @ 10:00) (11 - 21)  SpO2: 96% (06-29-21 @ 10:00) (95% - 99%)    EVD: [ ] [place]: [ ]     ICP:     CPP:     Level(cm):     24hr(ml):     CSF:     W:     R:     C:     P:     G:     LA:      I&O's Detail    28 Jun 2021 07:01  -  29 Jun 2021 07:00  --------------------------------------------------------  IN:    Oral Fluid: 180 mL  Total IN: 180 mL    OUT:    Voided (mL): 250 mL  Total OUT: 250 mL    Total NET: -70 mL        MEDICATIONS  (STANDING):  chlorhexidine 4% Liquid 1 Application(s) Topical daily  lisinopril 20 milliGRAM(s) Oral daily  pantoprazole    Tablet 40 milliGRAM(s) Oral before breakfast    MEDICATIONS  (PRN):      Labs:                        13.5   6.47  )-----------( 150      ( 29 Jun 2021 05:19 )             38.6     06-29    143  |  107  |  15  ----------------------------<  110<H>  3.9   |  30  |  0.9    Ca    8.8      29 Jun 2021 05:19  Mg     2.1     06-29    TPro  6.3  /  Alb  4.0  /  TBili  1.0  /  DBili  x   /  AST  30  /  ALT  27  /  AlkPhos  72  06-29    LIVER FUNCTIONS - ( 29 Jun 2021 05:19 )  Alb: 4.0 g/dL / Pro: 6.3 g/dL / ALK PHOS: 72 U/L / ALT: 27 U/L / AST: 30 U/L / GGT: x           PT/INR - ( 28 Jun 2021 23:23 )   PT: 12.50 sec;   INR: 1.09 ratio         PTT - ( 28 Jun 2021 23:23 )  PTT:35.3 sec      Radiology:    CT Perfusion w/ Maps w/ IV Cont:   EXAM:  CT ANGIO BRAIN (W)AW IC        EXAM:  CT ANGIO NECK (W)AW IC        EXAM:  CT PERFUSION W MAPS IC            PROCEDURE DATE:  06/28/2021      INTERPRETATION:  Clinical History / Reason for exam: Stroke code, sudden onset visual loss.    Technique: CT perfusion of the brain was performed along with CTA of the head and neck after the 120 cc of non-ionic intravenous administration of contrast. Sagittal, coronal and axial reformatted images were obtained as well as 3-D volume rendered images.    No comparison studies are available.    Findings:    CTA neck:    The origins of the great vessels are patent.    The bilateral common carotid arteries, internal carotid arteries, and vertebral arteries are patent.    CTA head:    The distal internal carotid arteries, middle cerebral arteries and anterior cerebral arteries are patent. Hypoplastic right A1 segment, normal variant. The right A2 segment arises from the anterior communicating artery.    The basilar artery, posterior cerebral arteries, and intracranial vertebral arteries are patent.    CT perfusion:    The cerebral blood volume and time to peak images demonstrate no evidence of fixed or mismatched focal perfusion deficit or surrounding ischemic penumbra to suggest acute cerebral ischemia or infarct.    IMPRESSION:    CTA head and neck:  No evidence of major vascular stenosis or occlusion.    CT perfusion:  No core infarct or ischemic penumbra.    EMERSON KENNEDY MD; Resident Radiologist  This document has been electronically signed.  KAITLIN PURDY MD; Attending Radiologist  This document has been electronically signed. Jun 29 2021  9:20AM (06-28-21 @ 23:45)  CT Angio Head w/ IV Cont:   EXAM:  CT ANGIO BRAIN (W)AW IC        EXAM:  CT ANGIO NECK (W)AW IC        EXAM:  CT PERFUSION W MAPS IC            PROCEDURE DATE:  06/28/2021      INTERPRETATION:  Clinical History / Reason for exam: Stroke code, sudden onset visual loss.    Technique: CT perfusion of the brain was performed along with CTA of the head and neck after the 120 cc of non-ionic intravenous administration of contrast. Sagittal, coronal and axial reformatted images were obtained as well as 3-D volume rendered images.    No comparison studies are available.    Findings:    CTA neck:    The origins of the great vessels are patent.    The bilateral common carotid arteries, internal carotid arteries, and vertebral arteries are patent.    CTA head:    The distal internal carotid arteries, middle cerebral arteries and anterior cerebral arteries are patent. Hypoplastic right A1 segment, normal variant. The right A2 segment arises from the anterior communicating artery.    The basilar artery, posterior cerebral arteries, and intracranial vertebral arteries are patent.    CT perfusion:    The cerebral blood volume and time to peak images demonstrate no evidence of fixed or mismatched focal perfusion deficit or surrounding ischemic penumbra to suggest acute cerebral ischemia or infarct.    IMPRESSION:    CTA head and neck:  No evidence of major vascular stenosis or occlusion.    CT perfusion:  No core infarct or ischemic penumbra.    EMERSON KENNEDY MD; Resident Radiologist  This document has been electronically signed.  KAITLIN PURDY MD; Attending Radiologist  This document has been electronically signed. Jun 29 2021  9:20AM (06-28-21 @ 23:56)    ROS: Negative except for that of HPI      Physical Exam:    General: Well appearing male, appears own age, NAD.    Neurological:    Mental Status: Alert and Oriented to person, place, and time, cooperative, pleasant. Affect appropriate, thought, speech, and language coherent.     Cranial Nerves:  I: Deferred  II, III, IV, VI: 4 mm  PERRLA, EOM intact. Noted decreased vision in left eye. +Tunnel vision in the left eye  V: facial sensation intact; masseter/ temporal muscles intact, corneal reflex intact bilaterally  VII: face symmetrical at rest and with expression  VIII: Deferred  IX and X: no hoarseness, gag intact, palate/ uvula rise symmetrically  XI: SCM/ trapezius strength intact bilateral  XII: no dysarthria, tongue weakness/fasiculation     Motor: Normal muscle bulk/tone. Good posture. Strength - Right UE:  5 /5  Right LE:  5 /5  Left UE:   5/5  Left LE:   5/5    Sensory: Sensation intact to light touch and painful stimuli.     Cerebellar Function: Normal gait including tandem, heel and toe walking. Pronator drift negative. Normal rapid alternating movements and point to point test.    Reflexes: No clonus    NIHSS:    1A: LOC 0   1B: LOC Questions 0   1C: Performs Tasks 0   2: Horizontal EOMs 0  3: Visual Fields 1  4: Facial Palsy 0   5A: LUE Drift 0   5B: RUE Drift 0   6A: LLE Drift 0   6B: RLE Drift 0   7: Limb Ataxia (heel-shin, FNF) 0   8: Sensation 0   9: Language/Aphasia 0   10: Dysarthria 0   11: Extinction/Inattention 0     NIHSS Total: 1    Assessment & Plan: 60 yo male PMHx HTN who p/w loss of vision in left eye. Code stroke in ED, given tPA, transferred to ICU for further management. On exam today, patient has +tunnel vision but otherwise nonfocal. Etiology of vision loss, less likely stroke but more primary ophthalmological.       Plan:    Neuro:  - Neuro Checks Q1H until after 24 hours post tPA then can do Q4H  - STAT CT Head with change in Neuro exam  - 24 hr CT  - MRI Brain  - Daily Statin  - Post tPA management  - Optho consult  - HOLD AC/AP  - OOB  -PT/OT    CV:  - SBP goal: SBP <180  - TTE w bubble  - Normovolemia  - Normothermia      Resp:  - HOB 45    GI/:  - Dysphagia eval- Can start diet now  - Strict Is/Os    Electrolytes:  - Replete as needed.  - Normoglycemia    ID:  - Per Primary    Hematology:  - SCDs      Thank you for involving [facility] in this patients plan of care. Please call with any questions or concerns.     Henry Toledo NP    #[id number]

## 2021-06-30 ENCOUNTER — TRANSCRIPTION ENCOUNTER (OUTPATIENT)
Age: 61
End: 2021-06-30

## 2021-06-30 LAB
A1C WITH ESTIMATED AVERAGE GLUCOSE RESULT: 6 % — HIGH (ref 4–5.6)
ALBUMIN SERPL ELPH-MCNC: 3.7 G/DL — SIGNIFICANT CHANGE UP (ref 3.5–5.2)
ALP SERPL-CCNC: 69 U/L — SIGNIFICANT CHANGE UP (ref 30–115)
ALT FLD-CCNC: 24 U/L — SIGNIFICANT CHANGE UP (ref 0–41)
ANA TITR SER: NEGATIVE — SIGNIFICANT CHANGE UP
ANION GAP SERPL CALC-SCNC: 7 MMOL/L — SIGNIFICANT CHANGE UP (ref 7–14)
AST SERPL-CCNC: 27 U/L — SIGNIFICANT CHANGE UP (ref 0–41)
BASOPHILS # BLD AUTO: 0.06 K/UL — SIGNIFICANT CHANGE UP (ref 0–0.2)
BASOPHILS NFR BLD AUTO: 1.2 % — HIGH (ref 0–1)
BILIRUB SERPL-MCNC: 0.7 MG/DL — SIGNIFICANT CHANGE UP (ref 0.2–1.2)
BUN SERPL-MCNC: 11 MG/DL — SIGNIFICANT CHANGE UP (ref 10–20)
CALCIUM SERPL-MCNC: 8.4 MG/DL — LOW (ref 8.5–10.1)
CHLORIDE SERPL-SCNC: 106 MMOL/L — SIGNIFICANT CHANGE UP (ref 98–110)
CHOLEST SERPL-MCNC: 146 MG/DL — SIGNIFICANT CHANGE UP
CO2 SERPL-SCNC: 26 MMOL/L — SIGNIFICANT CHANGE UP (ref 17–32)
CREAT SERPL-MCNC: 0.8 MG/DL — SIGNIFICANT CHANGE UP (ref 0.7–1.5)
EOSINOPHIL # BLD AUTO: 0.14 K/UL — SIGNIFICANT CHANGE UP (ref 0–0.7)
EOSINOPHIL NFR BLD AUTO: 2.8 % — SIGNIFICANT CHANGE UP (ref 0–8)
ESTIMATED AVERAGE GLUCOSE: 126 MG/DL — HIGH (ref 68–114)
GLUCOSE SERPL-MCNC: 129 MG/DL — HIGH (ref 70–99)
HCT VFR BLD CALC: 39 % — LOW (ref 42–52)
HDLC SERPL-MCNC: 42 MG/DL — SIGNIFICANT CHANGE UP
HGB BLD-MCNC: 13.6 G/DL — LOW (ref 14–18)
IMM GRANULOCYTES NFR BLD AUTO: 0.2 % — SIGNIFICANT CHANGE UP (ref 0.1–0.3)
LIPID PNL WITH DIRECT LDL SERPL: 95 MG/DL — SIGNIFICANT CHANGE UP
LYMPHOCYTES # BLD AUTO: 2.05 K/UL — SIGNIFICANT CHANGE UP (ref 1.2–3.4)
LYMPHOCYTES # BLD AUTO: 41.2 % — SIGNIFICANT CHANGE UP (ref 20.5–51.1)
MAGNESIUM SERPL-MCNC: 2 MG/DL — SIGNIFICANT CHANGE UP (ref 1.8–2.4)
MCHC RBC-ENTMCNC: 30.4 PG — SIGNIFICANT CHANGE UP (ref 27–31)
MCHC RBC-ENTMCNC: 34.9 G/DL — SIGNIFICANT CHANGE UP (ref 32–37)
MCV RBC AUTO: 87.2 FL — SIGNIFICANT CHANGE UP (ref 80–94)
MONOCYTES # BLD AUTO: 0.56 K/UL — SIGNIFICANT CHANGE UP (ref 0.1–0.6)
MONOCYTES NFR BLD AUTO: 11.3 % — HIGH (ref 1.7–9.3)
NEUTROPHILS # BLD AUTO: 2.15 K/UL — SIGNIFICANT CHANGE UP (ref 1.4–6.5)
NEUTROPHILS NFR BLD AUTO: 43.3 % — SIGNIFICANT CHANGE UP (ref 42.2–75.2)
NON HDL CHOLESTEROL: 104 MG/DL — SIGNIFICANT CHANGE UP
NRBC # BLD: 0 /100 WBCS — SIGNIFICANT CHANGE UP (ref 0–0)
PHOSPHATE SERPL-MCNC: 3.7 MG/DL — SIGNIFICANT CHANGE UP (ref 2.1–4.9)
PLATELET # BLD AUTO: 147 K/UL — SIGNIFICANT CHANGE UP (ref 130–400)
POTASSIUM SERPL-MCNC: 3.5 MMOL/L — SIGNIFICANT CHANGE UP (ref 3.5–5)
POTASSIUM SERPL-SCNC: 3.5 MMOL/L — SIGNIFICANT CHANGE UP (ref 3.5–5)
PROT SERPL-MCNC: 5.8 G/DL — LOW (ref 6–8)
RBC # BLD: 4.47 M/UL — LOW (ref 4.7–6.1)
RBC # FLD: 12.3 % — SIGNIFICANT CHANGE UP (ref 11.5–14.5)
SODIUM SERPL-SCNC: 139 MMOL/L — SIGNIFICANT CHANGE UP (ref 135–146)
TRIGL SERPL-MCNC: 110 MG/DL — SIGNIFICANT CHANGE UP
TSH SERPL-MCNC: 2.04 UIU/ML — SIGNIFICANT CHANGE UP (ref 0.27–4.2)
WBC # BLD: 4.97 K/UL — SIGNIFICANT CHANGE UP (ref 4.8–10.8)
WBC # FLD AUTO: 4.97 K/UL — SIGNIFICANT CHANGE UP (ref 4.8–10.8)

## 2021-06-30 PROCEDURE — 70450 CT HEAD/BRAIN W/O DYE: CPT | Mod: 26

## 2021-06-30 PROCEDURE — 71045 X-RAY EXAM CHEST 1 VIEW: CPT | Mod: 26

## 2021-06-30 PROCEDURE — 71045 X-RAY EXAM CHEST 1 VIEW: CPT | Mod: 26,77

## 2021-06-30 PROCEDURE — 99232 SBSQ HOSP IP/OBS MODERATE 35: CPT

## 2021-06-30 RX ORDER — ASPIRIN/CALCIUM CARB/MAGNESIUM 324 MG
1 TABLET ORAL
Qty: 30 | Refills: 0
Start: 2021-06-30 | End: 2021-07-29

## 2021-06-30 RX ORDER — ATORVASTATIN CALCIUM 80 MG/1
1 TABLET, FILM COATED ORAL
Qty: 30 | Refills: 0
Start: 2021-06-30 | End: 2021-07-29

## 2021-06-30 RX ADMIN — PANTOPRAZOLE SODIUM 40 MILLIGRAM(S): 20 TABLET, DELAYED RELEASE ORAL at 06:35

## 2021-06-30 RX ADMIN — LISINOPRIL 20 MILLIGRAM(S): 2.5 TABLET ORAL at 09:52

## 2021-06-30 NOTE — PROGRESS NOTE ADULT - SUBJECTIVE AND OBJECTIVE BOX
OVERNIGHT EVENTS: events noted, sp MRI/ CT head co no opthalmo???, neuro reviewed    Vital Signs Last 24 Hrs  T(C): 36 (30 Jun 2021 04:00), Max: 36.1 (29 Jun 2021 21:00)  T(F): 96.8 (30 Jun 2021 04:00), Max: 97 (29 Jun 2021 21:00)  HR: 60 (30 Jun 2021 07:00) (46 - 66)  BP: 140/94 (30 Jun 2021 07:00) (112/81 - 148/78)  BP(mean): 113 (30 Jun 2021 07:00) (81 - 114)  RR: 26 (30 Jun 2021 07:00) (10 - 31)  SpO2: 96% (30 Jun 2021 08:06) (95% - 99%)    PHYSICAL EXAMINATION:    GENERAL: The patient is awake and alert in no apparent distress.     HEENT: Head is normocephalic and atraumatic. Extraocular muscles are intact. Mucous membranes are moist.    NECK: Supple.    LUNGS: Clear to auscultation without wheezing, rales or rhonchi; respirations unlabored    HEART: Regular rate and rhythm without murmur.    ABDOMEN: Soft, nontender, and nondistended.      EXTREMITIES: Without any cyanosis, clubbing, rash, lesions or edema.    NEUROLOGIC: no changes l vision    SKIN: No ulceration or induration present.      LABS:                        13.6   4.97  )-----------( 147      ( 30 Jun 2021 05:30 )             39.0     06-30    139  |  106  |  11  ----------------------------<  129<H>  3.5   |  26  |  0.8    Ca    8.4<L>      30 Jun 2021 05:30  Phos  3.7     06-30  Mg     2.0     06-30    TPro  5.8<L>  /  Alb  3.7  /  TBili  0.7  /  DBili  x   /  AST  27  /  ALT  24  /  AlkPhos  69  06-30    PT/INR - ( 28 Jun 2021 23:23 )   PT: 12.50 sec;   INR: 1.09 ratio         PTT - ( 28 Jun 2021 23:23 )  PTT:35.3 sec  Urinalysis Basic - ( 29 Jun 2021 00:00 )    Color: Light Yellow / Appearance: Clear / SG: >1.050 / pH: x  Gluc: x / Ketone: Negative  / Bili: Negative / Urobili: <2 mg/dL   Blood: x / Protein: Trace / Nitrite: Negative   Leuk Esterase: Negative / RBC: x / WBC x   Sq Epi: x / Non Sq Epi: x / Bacteria: x        CARDIAC MARKERS ( 28 Jun 2021 23:23 )  x     / <0.01 ng/mL / x     / x     / x                      06-29-21 @ 07:01  -  06-30-21 @ 07:00  --------------------------------------------------------  IN: 600 mL / OUT: 1400 mL / NET: -800 mL        MICROBIOLOGY:      MEDICATIONS  (STANDING):  chlorhexidine 4% Liquid 1 Application(s) Topical daily  lisinopril 20 milliGRAM(s) Oral daily  pantoprazole    Tablet 40 milliGRAM(s) Oral before breakfast    MEDICATIONS  (PRN):      RADIOLOGY & ADDITIONAL STUDIES:

## 2021-06-30 NOTE — DISCHARGE NOTE PROVIDER - NSDCCPCAREPLAN_GEN_ALL_CORE_FT
PRINCIPAL DISCHARGE DIAGNOSIS  Diagnosis: Vision loss of left eye  Assessment and Plan of Treatment: PLEASE FOLLOW up with your eye doctor for further assessment.       PRINCIPAL DISCHARGE DIAGNOSIS  Diagnosis: Vision loss of left eye  Assessment and Plan of Treatment: You were found to have a blockage in one of the arteries that supply blood to your left eye. This resulted in the vision loss that brought you into the hospital. Please follow-up with your eye doctor (ophthalmologist) as an outpatient for further evaluation and assessment.       PRINCIPAL DISCHARGE DIAGNOSIS  Diagnosis: Vision loss of left eye  Assessment and Plan of Treatment: You were found to have a blockage in one of the arteries that supply blood to your left eye. This resulted in the vision loss that brought you into the hospital. Please follow-up with your eye doctor (ophthalmologist) in 1 month as an outpatient for further evaluation and assessment.  take all medication as directed

## 2021-06-30 NOTE — DISCHARGE NOTE PROVIDER - CARE PROVIDERS DIRECT ADDRESSES
,DirectAddress_Unknown ,DirectAddress_Unknown,[email or web address] ,DirectAddress_Unknown,[email or web address]

## 2021-06-30 NOTE — CONSULT NOTE ADULT - FUNDUS DESCRIPTIVE FINDINGS
Vascular tortuosity OU from HTN, has yellow Hollenhorst Plaque in the CRA OS, an uninvolved cilioretinal artery OShas a preserved island of retina corresponding to the course of the artery through the superior macula OS

## 2021-06-30 NOTE — CONSULT NOTE ADULT - ASSESSMENT
Called pt to review preop instructions. Pt has PCP clearance appt on 9/22. She will get EKG done at that time and p/u PreSurg Ensure. Reminded her 3 RX's will be sent to [facility] in [address] [place] [address] for her to p/u sometime next week. Reviewed instructions. 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   Recommend follow up with Dr. Zackary Mahoney in 1 month.  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   Recommend follow up with Dr. Zackary Mahoney in 1 month.  Would recommend ASA 81mg if not contraindicated       Pt examined with  and case d/w Dr. 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   Recommend follow up with Dr. Zackary Mahoney in 1 month.  Would recommend ASA 81mg if not contraindicated       Pt examined with  and case d/w Dr. 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   Recommend follow up with Dr. Zackary Mahoney in 1 month.  Would recommend ASA 81mg if not contraindicated       Pt examined with  and case d/w Dr. Zackary Mahoney MD        Assessment  - Central Retinal Artery Occlusion due to retinal plaque-like embolus with cilioretinal artery sparring, left eye. Will need follow as 20% of CRAO can develop rubeosis    - Hypertensive retinopathy OU    Plan   Pt educated.   Recommend follow up with Dr. Zackary Mahoney in 1 month.  Would recommend ASA 81mg if not contraindicated       Pt examined with  and case d/w Dr. Zackary Mahoney MD

## 2021-06-30 NOTE — DISCHARGE NOTE PROVIDER - HOSPITAL COURSE
Mr. Henry is a 61 year old (non smoker) male patient known to have:  - HTN. Home meds Quinapril 10mg BID and HCT 25mg QD  - Last TTE 06/29 EF 61%  He presented to the ED on 06/28 following an acute episode of blurry vision on left.  History goes back to PM of 06/28 when patient finished working in yard and was washing hands when he head sudden loss of vision in left eye (mainly peripheral field).   He denied any eye pain, light headedness, headache, motor or sensory deficits.- Stroke Code was activated and TPA administered at 00:00 AM 06/29- Imaging Tests  --> CT brain stroke protocol (06/28) No evidence of acute intracranial hemorrhage or acute territorial infarct.  --> CT Perfusion Angio Head and neck with IC (06/28) no stenosis or occlusion or infarct    - After administration of TPA at 00:00 AM 06/29, patient was admitted to MICU for post TPA monitoring- MICU stay was uncomplicated  - Neurology Team on board for post-TPA monitoring  - Follow up repeat CT H without contrast on 06/30 at 00:00 AM to rule out bleed 24 hours post TPA  < from: MR Orbits w/wo IV Cont (06.29.21 @ 21:53) >  IMPRESSION:  Unremarkable MRI of the orbits.  < from: MR Head No Cont (06.29.21 @ 21:52) >  1.  No evidence of recentinfarct or acute intracranial hemorrhage.  2.  Tiny chronic bilateral cerebellar infarcts.    - Keep off antiplatelets and AC  - TTE with bubble (06/29) EF 61%, DD I, no septal defects mentioned  - Check CRP (06/29) <3 and ESR (06/29) 9    HTN  * Home meds HCTZ 25mg QD and Quinapril 10mg BID 61 year old male patient, known hypertensive, non-smoker, no past surgical history, presented for acute loss of vision in left eye. On night of admission when the patient after finishing working in his yard, was washing his hands and then experienced acute onset of loss of vision in his left eye. Patient states he felt peripheral vision loss closing in, only able to see centrally, painless. No previous episodes. Not on A/C or aspirin.  Pt denies any headaches, dizziness, fevers, chills, dysarthria, focal weakness, falls.    He presented to the ED, patient was given systematic TPA with no change in vision. Admitted to ICU for monitoring. CT brain stroke protocol showed no evidence of acute intracranial hemorrhage or acute infarct. CTA showed no stenosis, occlusion, or infarct. MRI Head showed no evidence of recent infarct or acute hemorrhage. MRI orbits was unremarkable. TTE showed no septal defects. After 24 hours monitoring in the ICU post-tPA, patient downgraded to floors (3E) on 6/30. Ophthalmology consulted and performed fundoscopic exam, which revealed central retinal artery occlusion due to retinal embolus with cilioretinal artery sparring in the left eye. Patient started on ASA 81mg and instructed to follow-up outpatient with ophthalmology.

## 2021-06-30 NOTE — PROGRESS NOTE ADULT - ASSESSMENT
IMPRESSION  Acute onset vision loss in left eye R/O retinal disease/ ? stroike s/p TPA ; s/p CTA head and neck no improvement, sp MRI  H/O HTN    PLAN      CNS: Neurology Follow up . FUP MRI Opthalmology consult     HEENT: Oral care    PULMONARY:  HOB @ 45 degrees. Aspiration Precautions . CXR    CARDIOVASCULAR: Avoid volume overload .     GI: GI prophylaxis.  Feeding as tolerated     RENAL:  Follow up lytes.  Correct as needed     INFECTIOUS DISEASE: Follow up cultures. No abx for now    HEMATOLOGICAL:  DVT prophylaxis. B/L LE duplex .     ENDOCRINE:  Follow up FS.  Insulin protocol if needed.    MUSCULOSKELETAL: OOB to chair. B/L LE duplex    Floor per Neuro

## 2021-06-30 NOTE — DISCHARGE NOTE PROVIDER - PROVIDER TOKENS
PROVIDER:[TOKEN:[[id number]],FOLLOWUP:[1-3 days]] Patient PROVIDER:[TOKEN:[[id number]],FOLLOWUP:[1-3 days]],PROVIDER:[TOKEN:[[id number]],FOLLOWUP:[1 week]] PROVIDER:[TOKEN:[[id number]],FOLLOWUP:[2 weeks]],PROVIDER:[TOKEN:[[id number]],FOLLOWUP:[1 week]] PROVIDER:[TOKEN:[[id number]],FOLLOWUP:[1 week],ESTABLISHEDPATIENT:[T]],PROVIDER:[TOKEN:[[id number]],FOLLOWUP:[1 month]]

## 2021-06-30 NOTE — DISCHARGE NOTE PROVIDER - NSDCMRMEDTOKEN_GEN_ALL_CORE_FT
HYDROCHLOROTHIAZIDE 25MG TABLETS: TAKE 1 TABLET BY MOUTH EVERY DAY  QUINAPRIL 10MG TABLETS: TAKE 1 TABLET BY MOUTH TWICE DAILY   aspirin 81 mg oral delayed release tablet: 1 tab(s) orally once a day   atorvastatin 80 mg oral tablet: 1 tab(s) orally once a day   HYDROCHLOROTHIAZIDE 25MG TABLETS: TAKE 1 TABLET BY MOUTH EVERY DAY  QUINAPRIL 10MG TABLETS: TAKE 1 TABLET BY MOUTH TWICE DAILY

## 2021-06-30 NOTE — CONSULT NOTE ADULT - SUBJECTIVE AND OBJECTIVE BOX
DAVID FARRELL  MRN-[id number]  61y Male        Patient is a 61y old  Male who presents with a chief complaint of Loss of vision in left eye (30 Jun 2021 14:44)    HEALTH ISSUES - R/O PROBLEM Dx:    HPI:  61 year old male patient, known hypertensive, non-smoker, no past surgical history, presented for acute loss of vision in left eye.     Current history goes back to 10:15 PM of the night of admission when the patient after finishing working in his yard, was washing his hands and then experienced acute onset of loss of vision in his left eye. Patient states he felt peripheral vision loss closing in, only able to see centrally, painless. No previous episodes. Not on A/C or aspirin.  Pt denies any headaches, dizziness, fevers, chills, dysarthria, focal weakness, falls.  He presented to the ED, patient was given systematic TPA with no change in vision. Admitted to ICU for monitoring.  (29 Jun 2021 01:09)    PAST MEDICAL & SURGICAL HISTORY:  Hypertension    No significant past surgical history      MEDICATIONS  (STANDING):  chlorhexidine 4% Liquid 1 Application(s) Topical daily  lisinopril 20 milliGRAM(s) Oral daily  pantoprazole    Tablet 40 milliGRAM(s) Oral before breakfast    MEDICATIONS  (PRN):    Allergies    No Known Allergies    Intolerances      HEALTH ISSUES - PROBLEM Dx:          SKYLAR:  2 yrs ago - Optical  POHx: h/o LASIK OU ~ 1997  FOHx: Non-contributory     Extended HPI: painless loss of vision evening of 6/28/21 over a few second duration.  pt states was sitting outside and went indoors and while washing his hands noticed as waht he descibes as his vision going. Upon covering the right eye noticed loss of vision that started peripherally over a few seconds to a small residual area of vision in the temporal FOV OS. pt denies prior eposides of amourosisl dipoplia, pain or other neurological sxs. HAs noted over the last few hrs this morning, a slight improvement in the remaining  area of vision OS    Ocular Medications: none        EXTERNAL:    VISUAL ACUITY:       RIGHT EYE: sc: 20/25 - PH - 20/20                           LEFT EYE: sc: FC temp @ 1 ft      NEURO TESTING  EXTRAOCULAR MOVEMENTS: full oU  PUPILS: Pupils unequal, OS > OD with grade 3 [employer]  VFc: FTFC OD, HM in IT FOV OS    ANTERIOR SEGMENT EVALUATION: :    LIDS/ADENEXA: clear OU  CONJUNCTIVA/SCLERA: clear OU  CORNEA: clear OU; small paracentral SE opacity superior OD  ANTERIOR CHAMBER: deep/quiet OU  IRIS/PUPIL: flat OU  LENS/VITREOUS: mild NS OU    INTRAOCULAR PRESSURE:   OD:  10mmHg  OS:  10 mmHg  @ 10:30am    POSTERIOR SEGMENT EVALUATION  DFE: 0.5% Tropicamide, 2.5 % Phenylephrine OU    OPTIC NERVE:   MACULA: flat OD; retinal edema OS inferior and superior; retinal nydia with partial cherry red spot. + cilioretinal artery OS  A/V: constriction OS > OD, + retinal embolus in CRA nasal aspect of disc OS  FUNDUS/PERIPHERY: flat OU    SUPPLEMENTAL TESTING:   OCT OD macula flat OD; retinal edema   Flourescein Angiogram  OD normal perfusion  OS marked delay  and significant decrase in in perfursion;  small area of  normal perfusion due to cilioretinal artery OS         DAVID FARRELL  MRN-[id number]  61y Male        Patient is a 61y old  Male who presents with a chief complaint of Loss of vision in left eye (30 Jun 2021 14:44)    HEALTH ISSUES - R/O PROBLEM Dx:    HPI:  61 year old male patient, known hypertensive, non-smoker, no past surgical history, presented for acute loss of vision in left eye.     Current history goes back to 10:15 PM of the night of admission when the patient after finishing working in his yard, was washing his hands and then experienced acute onset of loss of vision in his left eye. Patient states he felt peripheral vision loss closing in, only able to see centrally, painless. No previous episodes. Not on A/C or aspirin.  Pt denies any headaches, dizziness, fevers, chills, dysarthria, focal weakness, falls.  He presented to the ED, patient was given systematic TPA with no change in vision. Admitted to ICU for monitoring.  (29 Jun 2021 01:09)    PAST MEDICAL & SURGICAL HISTORY:  Hypertension    No significant past surgical history      MEDICATIONS  (STANDING):  chlorhexidine 4% Liquid 1 Application(s) Topical daily  lisinopril 20 milliGRAM(s) Oral daily  pantoprazole    Tablet 40 milliGRAM(s) Oral before breakfast    MEDICATIONS  (PRN):    Allergies    No Known Allergies    Intolerances      HEALTH ISSUES - PROBLEM Dx:          SKYLAR:  2 yrs ago - Optical  POHx: h/o LASIK OU ~ 1997  FOHx: Non-contributory     Extended HPI: painless loss of vision evening of 6/28/21 over a few second duration.  pt states was sitting outside and went indoors and while washing his hands noticed as waht he descibes as his vision going. Upon covering the right eye noticed loss of vision that started peripherally over a few seconds to a small residual area of vision in the temporal FOV OS. pt denies prior eposides of amourosisl dipoplia, pain or other neurological sxs. HAs noted over the last few hrs this morning, a slight improvement in the remaining  area of vision OS    Chart Reviewed  <Stroke Code was activated and TPA administered at 00:00 AM 06/29- Imaging Tests  --> CT brain stroke protocol (06/28) No evidence of acute intracranial hemorrhage or acute territorial infarct.  --> CT Perfusion Angio Head and neck with IC (06/28) no stenosis or occlusion or infarct    - After administration of TPA at 00:00 AM 06/29, patient was admitted to MICU for post TPA monitoring- MICU stay was uncomplicated  - Neurology Team on board for post-TPA monitoring  - Follow up repeat CT H without contrast on 06/30 at 00:00 AM to rule out bleed 24 hours post TPA  < from: MR Orbits w/wo IV Cont (06.29.21 @ 21:53) >  IMPRESSION:  Unremarkable MRI of the orbits.  < from: MR Head No Cont (06.29.21 @ 21:52) >  1.  No evidence of recentinfarct or acute intracranial hemorrhage.  2.  Tiny chronic bilateral cerebellar infarcts.>      Ocular Medications: none        EXTERNAL:    VISUAL ACUITY:       RIGHT EYE: sc: 20/25 - PH - 20/20                           LEFT EYE: sc: FC temp @ 1 ft      NEURO TESTING  EXTRAOCULAR MOVEMENTS: full oU  PUPILS: Pupils unequal, OS > OD with grade 3 [employer]  VFc: FTFC OD, HM in IT FOV OS    ANTERIOR SEGMENT EVALUATION: :    LIDS/ADENEXA: clear OU  CONJUNCTIVA/SCLERA: clear OU  CORNEA: clear OU; small paracentral SE opacity superior OD  ANTERIOR CHAMBER: deep/quiet OU  IRIS/PUPIL: flat OU  LENS/VITREOUS: mild NS OU    INTRAOCULAR PRESSURE:   OD:  10mmHg  OS:  10 mmHg  @ 10:30am    POSTERIOR SEGMENT EVALUATION  DFE: 0.5% Tropicamide, 2.5 % Phenylephrine OU    OPTIC NERVE:   MACULA: flat OD; retinal edema OS inferior and superior; retinal nydia with partial cherry red spot. + cilioretinal artery OS  A/V: constriction OS > OD, + retinal embolus in CRA nasal aspect of disc OS  FUNDUS/PERIPHERY: flat OU    SUPPLEMENTAL TESTING:   OCT OD macula flat OD; retinal edema   Flourescein Angiogram  OD normal perfusion  OS marked delay  and significant decrase in in perfursion;  small area of  normal perfusion due to cilioretinal artery OS         DAVID FARRELL  MRN-[id number]  61y Male        Patient is a 61y old  Male who presents with a chief complaint of Loss of vision in left eye (30 Jun 2021 14:44)    HEALTH ISSUES - R/O PROBLEM Dx:    HPI:  61 year old male patient, known hypertensive, non-smoker, no past surgical history, presented for acute loss of vision in left eye.     Current history goes back to 10:15 PM of the night of admission when the patient after finishing working in his yard, was washing his hands and then experienced acute onset of loss of vision in his left eye. Patient states he felt peripheral vision loss closing in, only able to see centrally, painless. No previous episodes. Not on A/C or aspirin.  Pt denies any headaches, dizziness, fevers, chills, dysarthria, focal weakness, falls.  He presented to the ED, patient was given systematic TPA with no change in vision. Admitted to ICU for monitoring.  (29 Jun 2021 01:09)    PAST MEDICAL & SURGICAL HISTORY:  Hypertension    No significant past surgical history      MEDICATIONS  (STANDING):  chlorhexidine 4% Liquid 1 Application(s) Topical daily  lisinopril 20 milliGRAM(s) Oral daily  pantoprazole    Tablet 40 milliGRAM(s) Oral before breakfast    MEDICATIONS  (PRN):    Allergies    No Known Allergies    Intolerances      HEALTH ISSUES - PROBLEM Dx:          SKYLAR:  2 yrs ago - Optical  POHx: h/o LASIK OU ~ 1997  FOHx: Non-contributory     Extended HPI: painless loss of vision evening of 6/28/21 over a few second duration.  pt states was sitting outside and went indoors and while washing his hands noticed as waht he descibes as his vision going. Upon covering the right eye noticed loss of vision that started peripherally over a few seconds to a small residual area of vision in the temporal FOV OS. pt denies prior eposides of amourosisl dipoplia, pain or other neurological sxs. HAs noted over the last few hrs this morning, a slight improvement in the remaining  area of vision OS    Chart Reviewed  <Stroke Code was activated and TPA administered at 00:00 AM 06/29- Imaging Tests  --> CT brain stroke protocol (06/28) No evidence of acute intracranial hemorrhage or acute territorial infarct.  --> CT Perfusion Angio Head and neck with IC (06/28) no stenosis or occlusion or infarct    - After administration of TPA at 00:00 AM 06/29, patient was admitted to MICU for post TPA monitoring- MICU stay was uncomplicated  - Neurology Team on board for post-TPA monitoring  - Follow up repeat CT H without contrast on 06/30 at 00:00 AM to rule out bleed 24 hours post TPA  < from: MR Orbits w/wo IV Cont (06.29.21 @ 21:53) >  IMPRESSION:  Unremarkable MRI of the orbits.  < from: MR Head No Cont (06.29.21 @ 21:52) >  1.  No evidence of recentinfarct or acute intracranial hemorrhage.  2.  Tiny chronic bilateral cerebellar infarcts.>      Ocular Medications: none        EXTERNAL:    VISUAL ACUITY:       RIGHT EYE: sc: 20/25 - PH - 20/20                           LEFT EYE: sc: FC temp @ 1 ft      NEURO TESTING  EXTRAOCULAR MOVEMENTS: full oU  PUPILS: Pupils unequal, OS > OD with grade 3 [employer]  VFc: FTFC OD, HM in IT FOV OS    ANTERIOR SEGMENT EVALUATION: :    LIDS/ADENEXA: clear OU  CONJUNCTIVA/SCLERA: clear OU  CORNEA: clear OU; small paracentral SE opacity superior OD  ANTERIOR CHAMBER: deep/quiet OU  IRIS/PUPIL: flat OU  LENS/VITREOUS: mild NS OU    INTRAOCULAR PRESSURE:   OD:  10mmHg  OS:  10 mmHg  @ 10:30am    POSTERIOR SEGMENT EVALUATION  DFE: 0.5% Tropicamide, 2.5 % Phenylephrine OU    OPTIC NERVE:   MACULA: flat OD; retinal edema OS inferior and superior; retinal nydia with partial cherry red spot. + cilioretinal artery OS  A/V: constriction OS > OD, + retinal embolus in CRA nasal aspect of disc OS  FUNDUS/PERIPHERY: flat OU    SUPPLEMENTAL TESTING:   OCT OD macula flat OD; retinal edema   Flourescein Angiogram  OD normal perfusion  OS marked delay  and significant decrease in in perfusion;  small area of  normal perfusion due to cilioretinal artery OS

## 2021-06-30 NOTE — DISCHARGE NOTE PROVIDER - CARE PROVIDER_API CALL
Dakota Boyd   MEDICINE  [address] [address]  Phone: [phone]  Fax: [phone]  Follow Up Time: 1-3 days   Dakota Boyd [address]  [place] [address]  Phone: [phone]  Fax: [phone]  Follow Up Time: 1-3 days    Clay Vasquez)  EEGEpilepsy; Neurology  [address] [place] [address], [address]  [place] [address]  Phone: [phone]  Fax: [phone]  Follow Up Time: 1 week   Dakota Boyd  MEDICINE  [address] [facility] [address]  [place] [address]  Phone: [phone]  Fax: [phone]  Follow Up Time: 2 weeks    Zackary Mahoney  Ophthalmology  [address] [place] [address]  Phone: [phone]  Fax: [phone]  Follow Up Time: 1 week   Dakota Boyd  MEDICINE  [address] [facility] [address]  [place] [address]  Phone: [phone]  Fax: [phone]  Established Patient  Follow Up Time: 1 week    Zackary Mahoney  Ophthalmology  [address] [place] [address]  Phone: [phone]  Fax: [phone]  Follow Up Time: 1 month

## 2021-06-30 NOTE — CHART NOTE - NSCHARTNOTEFT_GEN_A_CORE
Transfer Note      Mr. Henry is a 61 year old (non smoker) male patient known to have:  - HTN. Home meds Quinapril 10mg BID and HCT 25mg QD  - Last TTE 06/29 EF 61%      He presented to the ED on 06/28 following an acute episode of blurry vision on left.  History goes back to 9when the patient started complaining of ...  On review of systems, patient denies any recent fever, chills, night sweats, URTI symptoms (cough, rhinorrhea, sore throat), urinary symptoms (urinary frequency, urgency, intermittence, dysuria, foul smelling urine, cloudy urine), change in bowel movements (diarrhea or constipation), abdominal pain, headache, nausea, or vomiting. No sick contacts. No recent travel or exposure to recent travelers.      Upon presentation to the ED, the patient was hemodynamically stable:  Vital Signs in ED on 03/08/2020  * POCT  * ECG  * Troponin      Physical Exam in ED on 03/08/2020 Transfer Note      Location: [facility]  A ([facility] ICU)  Patient Name: DAVID HENRY  Age: 61y  Gender: Male      Mr. Henry is a 61 year old (non smoker) male patient known to have:  - HTN. Home meds Quinapril 10mg BID and HCT 25mg QD  - Last TTE 06/29 EF 61%      He presented to the ED on 06/28 following an acute episode of blurry vision on left.  History goes back to PM of 06/28 when patient finished working in yard and was washing hands when he head sudden loss of vision in left eye (mainly peripheral field).   He denied any eye pain, light headedness, headache, motor or sensory deficits.      On review of systems, patient denies any recent fever, chills, night sweats, URTI symptoms (cough, rhinorrhea, sore throat), urinary symptoms (urinary frequency, urgency, intermittence, dysuria, foul smelling urine, cloudy urine), change in bowel movements (diarrhea or constipation), abdominal pain, headache, nausea, or vomiting. No sick contacts. No recent travel or exposure to recent travelers.      Upon presentation to the ED:  - Stroke Code was activated and TPA administered at 00:00 AM 06/29  - Patient was hemodynamically stable:  Vital Signs in ED   --> /91mmHg  --> HR 77  --> RR 18  --> SaO2 98% on RA      Investigations in ED  - Blood Tests  --> CBC WBC 7.06, Hb 14.5, Plt 180  --> CMP Na 143, K 3.5, BUN 16, Cr 1.0  --> INR 1.09      - Imaging Tests  --> CT brain stroke protocol (06/28) No evidence of acute intracranial hemorrhage or acute territorial infarct.  --> CT Perfusion Angio Head and neck with IC (06/28) no stenosis or occlusion or infarct      - After administration of TPA at 00:00 AM 06/29, patient was admitted to MICU for post TPA monitoring  - Repeat CT head and MR head and orbits were performed on 06/29 pending read  - Ophthalmology Team was contacted on 06/29 and they will transport patient to their department on 06/30 for ophthalmologic evaluation  - MICU stay was uncomplicated  - Please refer to below assessment and plan section for a more detailed hospital course      Vital Signs in the last 24 hours   Vitals Summary T(C): 36 (06-30-21 @ 04:00), Max: 36.1 (06-29-21 @ 21:00)  HR: 48 (06-30-21 @ 08:00) (46 - 66)  BP: 140/80 (06-30-21 @ 08:00) (112/81 - 148/78)  RR: 23 (06-30-21 @ 08:00) (10 - 31)  SpO2: 96% (06-30-21 @ 08:06) (95% - 99%)  Vent Data   Intake/ Output   06-29-21 @ 07:01  -  06-30-21 @ 07:00  --------------------------------------------------------  IN: 600 mL / OUT: 1400 mL / NET: -800 mL      Physical Exam  * General Appearance: Alert, cooperative, interactive, well-groomed, oriented to time, place, and person, in no acute distress  * Head: Left pupillary dilation, reduced reactivity to light, decreased left eye peripheral visual field, no nystagmus, Normocephalic, without obvious abnormality, atraumatic  * Ears: Normal TM's and external ear canals bilaterally  * Nose: Nares normal, septum midline, mucosa normal, no drainage or sinus tenderness  * Throat: Lips, mucosa, and tongue normal; teeth and gums normal  * Neck: Supple, symmetrical, trachea midline, no adenopathy;   * Thyroid:  No enlargement/tenderness/nodules; no carotid bruit or JVD  * Lungs: Respirations unlabored, Good bilateral air entry, normal breath sounds (Clear to auscultation bilaterally, no audible wheezes, crackles, or rhonchi)  * Heart: Regular Rate and Rhythm, normal S1 and S2, no audible murmur, rub, or gallop  * Abdomen: Symmetric, non-distended, no scar, soft, non-tender, bowel sounds active all four quadrants, no masses, no organomegaly (no hepatosplenomegaly)  * Extremities: Extremities normal, atraumatic, no cyanosis, no lower extremity pitting edema bilaterally, adequate dorsalis pedis pulses  * Pulses: 2+ and symmetric all extremities  * Neurologic: Left pupillary dilation, reduced reactivity to light, decreased left eye peripheral visual field, no nystagmus      Investigations   Laboratory Workup  - CBC:                        13.6   4.97  )-----------( 147      ( 30 Jun 2021 05:30 )             39.0     - Chemistry:  06-30    139  |  106  |  11  ----------------------------<  129<H>  3.5   |  26  |  0.8    Ca    8.4<L>      30 Jun 2021 05:30  Phos  3.7     06-30  Mg     2.0     06-30    TPro  5.8<L>  /  Alb  3.7  /  TBili  0.7  /  DBili  x   /  AST  27  /  ALT  24  /  AlkPhos  69  06-30    - Coagulation Studies:  PT/INR - ( 28 Jun 2021 23:23 )   PT: 12.50 sec;   INR: 1.09 ratio         PTT - ( 28 Jun 2021 23:23 )  PTT:35.3 sec  - ABG:    - Cardiac Markers:  CARDIAC MARKERS ( 28 Jun 2021 23:23 )  x     / <0.01 ng/mL / x     / x     / x          Microbiological Workup  Urinalysis Basic - ( 29 Jun 2021 00:00 )    Color: Light Yellow / Appearance: Clear / SG: >1.050 / pH: x  Gluc: x / Ketone: Negative  / Bili: Negative / Urobili: <2 mg/dL   Blood: x / Protein: Trace / Nitrite: Negative   Leuk Esterase: Negative / RBC: x / WBC x   Sq Epi: x / Non Sq Epi: x / Bacteria: x      Current Medications  Standing Medications  chlorhexidine 4% Liquid 1 Application(s) Topical daily  lisinopril 20 milliGRAM(s) Oral daily  pantoprazole    Tablet 40 milliGRAM(s) Oral before breakfast    PRN Medications    Singles Doses Administered  (Floorstock) 1 each &lt;see task&gt; GiveOnce  (Floorstock) 1 each &lt;see task&gt; GiveOnce  (Floorstock) 1 each &lt;see task&gt; GiveOnce  alteplase    Bolus 8.6 milliGRAM(s) IV Bolus Once  alteplase    IVPB 77.2 milliGRAM(s) IV Intermittent Once  simethicone 80 milliGRAM(s) Chew once      Assessment and Plan  Case of a 61 year old male patient with history of HTN who presented on 06/28 following acute loss of left sided peripheral visual field, s/p stroke code in ED s/p negative imaging of the head s/p TPA on 06/29 at 00:00 AM, admitted to MICU for post-TPA monitoring and for ophthalmological evaluation for suspected left central retinal artery occlusion. Currently hemodynamically stable.      Suspected Left Central Retinal Artery Occlusion  * Presentation to ED 06/28 PM acute loss of left sided peripheral visual field  * CT brain stroke protocol (06/28) No evidence of acute intracranial hemorrhage or acute territorial infarct.  * Stroke Code was called in ED s/p TPA at 06/29 at 00:00 AM    - Neurology Team on board for post-TPA monitoring  - Follow up repeat CT H without contrast on 06/30 at 00:00 AM to rule out bleed 24 hours post TPA  - Follow up MRI head wo and orbits with and wo IC (06/29)  - Follow up ophthalmology consult: will transport patient to opthalmology department for evaluation  - Monitor BP closely  - Keep off antiplatelets and AC  - Follow up lipid profile, Hba1c, and TSH ordered for 06/30  - Monitor telemetry  - TTE with bubble (06/29) EF 61%, DD I, no septal defects mentioned  - Check CRP (06/29) <3 and ESR (06/29) 9      HTN  * Home meds HCTZ 25mg QD and Quinapril 10mg BID  - Monitor BP closely  - Continue Lisinopril 20mg QD      Others  - DVT Prophylaxis: Off Anticoagulation for now  - GI Prophylaxis: Pantoprazole 40mg PO QD  - Diet: DASH/ TLC  - Code Status: Full      Barriers to learning: NO  Discharge Planning: Patient will be discharged once stable and  Plan was communicated with patient and medical team      Zana Marques MD  PGY - 1 Internal Medicine   [facility]

## 2021-07-01 ENCOUNTER — TRANSCRIPTION ENCOUNTER (OUTPATIENT)
Age: 61
End: 2021-07-01

## 2021-07-01 VITALS — SYSTOLIC BLOOD PRESSURE: 160 MMHG | RESPIRATION RATE: 16 BRPM | DIASTOLIC BLOOD PRESSURE: 87 MMHG

## 2021-07-01 LAB
ALBUMIN SERPL ELPH-MCNC: 3.9 G/DL — SIGNIFICANT CHANGE UP (ref 3.5–5.2)
ALP SERPL-CCNC: 93 U/L — SIGNIFICANT CHANGE UP (ref 30–115)
ALT FLD-CCNC: 26 U/L — SIGNIFICANT CHANGE UP (ref 0–41)
ANION GAP SERPL CALC-SCNC: 8 MMOL/L — SIGNIFICANT CHANGE UP (ref 7–14)
AST SERPL-CCNC: 30 U/L — SIGNIFICANT CHANGE UP (ref 0–41)
BASOPHILS # BLD AUTO: 0.06 K/UL — SIGNIFICANT CHANGE UP (ref 0–0.2)
BASOPHILS NFR BLD AUTO: 1.3 % — HIGH (ref 0–1)
BILIRUB SERPL-MCNC: 0.6 MG/DL — SIGNIFICANT CHANGE UP (ref 0.2–1.2)
BUN SERPL-MCNC: 12 MG/DL — SIGNIFICANT CHANGE UP (ref 10–20)
CALCIUM SERPL-MCNC: 8.6 MG/DL — SIGNIFICANT CHANGE UP (ref 8.5–10.1)
CHLORIDE SERPL-SCNC: 107 MMOL/L — SIGNIFICANT CHANGE UP (ref 98–110)
CO2 SERPL-SCNC: 28 MMOL/L — SIGNIFICANT CHANGE UP (ref 17–32)
COVID-19 SPIKE DOMAIN AB INTERP: NEGATIVE — SIGNIFICANT CHANGE UP
COVID-19 SPIKE DOMAIN ANTIBODY RESULT: 0.4 U/ML — SIGNIFICANT CHANGE UP
CREAT SERPL-MCNC: 0.8 MG/DL — SIGNIFICANT CHANGE UP (ref 0.7–1.5)
EOSINOPHIL # BLD AUTO: 0.15 K/UL — SIGNIFICANT CHANGE UP (ref 0–0.7)
EOSINOPHIL NFR BLD AUTO: 3.2 % — SIGNIFICANT CHANGE UP (ref 0–8)
GLUCOSE SERPL-MCNC: 132 MG/DL — HIGH (ref 70–99)
HCT VFR BLD CALC: 39.8 % — LOW (ref 42–52)
HGB BLD-MCNC: 13.7 G/DL — LOW (ref 14–18)
IMM GRANULOCYTES NFR BLD AUTO: 0.2 % — SIGNIFICANT CHANGE UP (ref 0.1–0.3)
LYMPHOCYTES # BLD AUTO: 1.53 K/UL — SIGNIFICANT CHANGE UP (ref 1.2–3.4)
LYMPHOCYTES # BLD AUTO: 32.6 % — SIGNIFICANT CHANGE UP (ref 20.5–51.1)
MAGNESIUM SERPL-MCNC: 1.9 MG/DL — SIGNIFICANT CHANGE UP (ref 1.8–2.4)
MCHC RBC-ENTMCNC: 30.5 PG — SIGNIFICANT CHANGE UP (ref 27–31)
MCHC RBC-ENTMCNC: 34.4 G/DL — SIGNIFICANT CHANGE UP (ref 32–37)
MCV RBC AUTO: 88.6 FL — SIGNIFICANT CHANGE UP (ref 80–94)
MONOCYTES # BLD AUTO: 0.52 K/UL — SIGNIFICANT CHANGE UP (ref 0.1–0.6)
MONOCYTES NFR BLD AUTO: 11.1 % — HIGH (ref 1.7–9.3)
NEUTROPHILS # BLD AUTO: 2.42 K/UL — SIGNIFICANT CHANGE UP (ref 1.4–6.5)
NEUTROPHILS NFR BLD AUTO: 51.6 % — SIGNIFICANT CHANGE UP (ref 42.2–75.2)
NRBC # BLD: 0 /100 WBCS — SIGNIFICANT CHANGE UP (ref 0–0)
PHOSPHATE SERPL-MCNC: 3.4 MG/DL — SIGNIFICANT CHANGE UP (ref 2.1–4.9)
PLATELET # BLD AUTO: 147 K/UL — SIGNIFICANT CHANGE UP (ref 130–400)
POTASSIUM SERPL-MCNC: 3.8 MMOL/L — SIGNIFICANT CHANGE UP (ref 3.5–5)
POTASSIUM SERPL-SCNC: 3.8 MMOL/L — SIGNIFICANT CHANGE UP (ref 3.5–5)
PROT SERPL-MCNC: 6 G/DL — SIGNIFICANT CHANGE UP (ref 6–8)
RBC # BLD: 4.49 M/UL — LOW (ref 4.7–6.1)
RBC # FLD: 12.5 % — SIGNIFICANT CHANGE UP (ref 11.5–14.5)
SARS-COV-2 IGG+IGM SERPL QL IA: 0.4 U/ML — SIGNIFICANT CHANGE UP
SARS-COV-2 IGG+IGM SERPL QL IA: NEGATIVE — SIGNIFICANT CHANGE UP
SODIUM SERPL-SCNC: 143 MMOL/L — SIGNIFICANT CHANGE UP (ref 135–146)
WBC # BLD: 4.69 K/UL — LOW (ref 4.8–10.8)
WBC # FLD AUTO: 4.69 K/UL — LOW (ref 4.8–10.8)

## 2021-07-01 PROCEDURE — 99239 HOSP IP/OBS DSCHRG MGMT >30: CPT

## 2021-07-01 PROCEDURE — 93970 EXTREMITY STUDY: CPT | Mod: 26

## 2021-07-01 RX ORDER — LISINOPRIL 2.5 MG/1
10 TABLET ORAL ONCE
Refills: 0 | Status: COMPLETED | OUTPATIENT
Start: 2021-07-01 | End: 2021-07-01

## 2021-07-01 RX ORDER — ASPIRIN/CALCIUM CARB/MAGNESIUM 324 MG
81 TABLET ORAL DAILY
Refills: 0 | Status: DISCONTINUED | OUTPATIENT
Start: 2021-07-01 | End: 2021-07-01

## 2021-07-01 RX ADMIN — LISINOPRIL 10 MILLIGRAM(S): 2.5 TABLET ORAL at 15:45

## 2021-07-01 RX ADMIN — PANTOPRAZOLE SODIUM 40 MILLIGRAM(S): 20 TABLET, DELAYED RELEASE ORAL at 05:32

## 2021-07-01 RX ADMIN — Medication 81 MILLIGRAM(S): at 13:31

## 2021-07-01 RX ADMIN — LISINOPRIL 20 MILLIGRAM(S): 2.5 TABLET ORAL at 05:32

## 2021-07-01 NOTE — PROGRESS NOTE ADULT - SUBJECTIVE AND OBJECTIVE BOX
Discharge note    DAVID FARRELL  61y Male    INTERVAL HPI/OVERNIGHT EVENTS:    Pt still with decreased vision of the left eye  + mild headache  No N/V, chest pain, SOB, weakness or other complaints.  Ambulated and did stair training with PT today    T(F): 96.2 (07-01-21 @ 13:30), Max: 97.7 (06-30-21 @ 20:36)  HR: 56 (07-01-21 @ 13:30) (53 - 64)  BP: 156/94 (07-01-21 @ 13:30) (141/85 - 156/94)  RR: 18 (07-01-21 @ 13:30) (18 - 20)  SpO2: 98% (07-01-21 @ 08:00) (98% - 98%) on RA    PHYSICAL EXAM:  GENERAL: NAD  HEAD:  Normocephalic  EYES:  conjunctiva and sclera clear  ENMT: Moist mucous membranes  NECK: Supple  NERVOUS SYSTEM:  Alert & Oriented X3, Good concentration  decreased visual acuity of left eye, motor 5/5, EOMI, ambulating  CHEST/LUNG: Clear to percussion bilaterally; No rales, rhonchi, wheezing  HEART: Regular rate and rhythm; No murmurs  ABDOMEN: Soft, Nontender, Nondistended  EXTREMITIES:   No edema  SKIN: No rashes     Consultant(s) Notes Reviewed:  [x ] YES  [ ] NO  Care Discussed with Consultants/Other Providers [ x] YES  [ ] NO    MEDICATIONS  (STANDING):  aspirin  chewable 81 milliGRAM(s) Oral daily  chlorhexidine 4% Liquid 1 Application(s) Topical daily  lisinopril 20 milliGRAM(s) Oral daily  pantoprazole    Tablet 40 milliGRAM(s) Oral before breakfast    MEDICATIONS  (PRN):  aluminum hydroxide/magnesium hydroxide/simethicone Suspension 30 milliLiter(s) Oral every 6 hours PRN Dyspepsia      LABS:                        13.7   4.69  )-----------( 147      ( 01 Jul 2021 06:58 )             39.8     07-01    143  |  107  |  12  ----------------------------<  132<H>  3.8   |  28  |  0.8    Ca    8.6      01 Jul 2021 06:58  Phos  3.4     07-01  Mg     1.9     07-01    TPro  6.0  /  Alb  3.9  /  TBili  0.6  /  DBili  x   /  AST  30  /  ALT  26  /  AlkPhos  93  07-01          RADIOLOGY & ADDITIONAL TESTS:    Imaging or report Personally Reviewed:  [ ] YES  [ ] NO    < from: MR Orbits w/wo IV Cont (06.29.21 @ 21:53) >  IMPRESSION:    Unremarkable MRI of the orbits.    < end of copied text >      < from: MR Head No Cont (06.29.21 @ 21:52) >  IMPRESSION:    1.  No evidence of recentinfarct or acute intracranial hemorrhage.    2.  Tiny chronic bilateral cerebellar infarcts.      < end of copied text >      Case discussed with resident and RN    Care discussed with pt

## 2021-07-01 NOTE — PROGRESS NOTE ADULT - REASON FOR ADMISSION
Loss of vision in left eye
Left Visual Loss
Loss of vision in left eye

## 2021-07-01 NOTE — OCCUPATIONAL THERAPY INITIAL EVALUATION ADULT - PERTINENT HX OF CURRENT PROBLEM, REHAB EVAL
61 year old male patient, known hypertensive, non-smoker, no past surgical history, presented for acute loss of vision in left eye.

## 2021-07-01 NOTE — PROGRESS NOTE ADULT - ASSESSMENT
60 y/o man with PMH of HTN presented on 06/28 following acute loss of left sided peripheral visual field, s/p stroke code in ED s/p negative imaging of the head and s/p TPA on 06/29 at 00:00 AM. He was admitted to MICU for post-TPA monitoring and for ophthalmological evaluation for suspected left central retinal artery occlusion.     1.  Left Central Retinal Artery Occlusion  - confirmed on ophthalmology consultation yesterday  - due to retinal embolus with cilioretinal artey sparring, left eye  - hypertensive retinopathy b/l  - continue ASA 81mg daily  - add atorvastatin 40mg daily  - outpt f/u with Dr. Mahoney in 1 month  - may discharge home today if cleared by neurology and if venous duplex of LE is negative  - outpt f/u with PMD  - TTE with bubble (06/29) EF 61%, DD I, no septal defects mentioned  - CRP (06/29) <3 and ESR (06/29) 9      2. HTN - resume home meds and outpt f/u    3. Prediabetes - discussed with pt re: diet control      PROGRESS NOTE HANDOFF    Pending: neuro f/u, venous duplex of LE  pt aware of plan of care  Disposition: home    spent 45 minutes on the discharge process of this pt 62 y/o man with PMH of HTN presented on 06/28 following acute loss of left sided peripheral visual field, s/p stroke code in ED s/p negative imaging of the head and s/p TPA on 06/29 at 00:00 AM. He was admitted to MICU for post-TPA monitoring and for ophthalmological evaluation for suspected left central retinal artery occlusion.     1.  Left Central Retinal Artery Occlusion  - confirmed on ophthalmology consultation yesterday  - due to retinal embolus with cilioretinal artey sparring, left eye  - hypertensive retinopathy b/l  - continue ASA 81mg daily  - add atorvastatin 40mg daily  - outpt f/u with Dr. Mahoney in 1 month  - may discharge home today if cleared by neurology and if venous duplex of LE is negative  - outpt f/u with PMD  - TTE with bubble (06/29) EF 61%, DD I, no septal defects mentioned  - CRP (06/29) <3 and ESR (06/29) 9      2. HTN - resume home meds and outpt f/u    3. Prediabetes - discussed with pt re: diet control      PROGRESS NOTE HANDOFF    Pending: neuro f/u, venous duplex of LE  pt aware of plan of care  Disposition: home    spent 45 minutes on the discharge process of this pt    addendum: venous duplex of LE negative for DVT - preliminary report

## 2021-07-01 NOTE — OCCUPATIONAL THERAPY INITIAL EVALUATION ADULT - GENERAL OBSERVATIONS, REHAB EVAL
Pt received supine in bed with HOB elevated, +call bell, +bed alarm, +IV lock, NAD. Pt left as received with all lines intact.

## 2021-07-01 NOTE — DISCHARGE NOTE NURSING/CASE MANAGEMENT/SOCIAL WORK - PATIENT PORTAL LINK FT
You can access the FollowMyHealth Patient Portal offered by [facility] by registering at the following website: [email or web address]. By joining [employer]’s FollowMyHealth portal, you will also be able to view your health information using other applications (apps) compatible with our system.

## 2021-07-01 NOTE — CHART NOTE - NSCHARTNOTEFT_GEN_A_CORE
<<<RESIDENT DISCHARGE NOTE>>>     DAVID FARRELL  MRN-[id number]    Patient slept well overnight. No acute complaints this AM. Patient reports his peripheral vision has minimally improved since yesterday. Patient does not report fevers, chills, CP, SOB, or n/v/d/c. Patient is overall clinically stable and ready for discharge from hospital. Discussed patient with neurology consult team who wanted patient to follow up with opthalmology clinic outpatient. No need for patient to follow up with neurology clinic as stroke effectively ruled out.    ========================VITALS/EXAM========================  VITALS  T(C): 35.7 (07-01-21 @ 13:30), Max: 36.5 (06-30-21 @ 20:36)  HR: 56 (07-01-21 @ 13:30) (53 - 64)  BP: 156/94 (07-01-21 @ 13:30) (141/85 - 156/94)  RR: 18 (07-01-21 @ 13:30) (18 - 20)  SpO2: 98% (07-01-21 @ 08:00) (98% - 98%)    PHYSICAL EXAM  GENERAL: NAD, well-developed  CHEST/LUNG: Clear to auscultation bilaterally; No wheezes, rales or rhonchi  HEART: Regular rate and rhythm; No murmurs, rubs, or gallops  ABDOMEN: Soft, Nontender, Nondistended; Bowel sounds present, no masses.  EXTREMITIES:  2+ Peripheral Pulses, No clubbing, cyanosis, or edema  NEURO: A&Ox4; 5/5 strength in all extremities; reduced visual acuity in L eye    ============================LABS============================             13.7<L>  4.69<L> )-----------( 147      ( 07-01-21 @ 06:58 )             39.8<L>    143  |  107  |  12  ----------------------------<  132<H>   07-01-21 @ 06:58  3.8  |  28  |  0.8    Ca      8.6     07-01-21 @ 06:58  Phos   3.4     07-01-21 @ 06:58  Mg     1.9     07-01-21 @ 06:58    TPro  6.0  /  Alb  3.9  /  TBili  0.6  /  DBili  x   /  AST  30  /  ALT  26  /  AlkPhos  93  /  GGT  x     07-01-21 @ 06:58      =======================Micro/Rad/Cardio=======================    IMAGING    < from: Xray Chest 1 View-PORTABLE IMMEDIATE (Xray Chest 1 View-PORTABLE IMMEDIATE .) (06.30.21 @ 14:38) >    Impression:  No radiographic evidence of acute cardiopulmonary disease.    < end of copied text >        FINAL DISCHARGE INTERVIEW:  Resident(s) Present: (Name: Dr. Khari Crain), RN Present: (Name:  Cecilia)    DISCHARGE MEDICATION RECONCILIATION  reviewed with Attending (Name: Dr. Palmer)    DISPOSITION:   [ X ] Home,    [  ] Home with Visiting Nursing Services,   [    ]  SNF/ NH,    [   ] Acute Rehab (4A),   [   ] Other (Specify:_________)

## 2021-07-01 NOTE — CONSULT NOTE ADULT - SUBJECTIVE AND OBJECTIVE BOX
HPI:  61 year old male patient, known hypertensive, non-smoker, no past surgical history, presented for acute loss of vision in left eye.     Current history goes back to 10:15 PM of the night of admission when the patient after finishing working in his yard, was washing his hands and then experienced acute onset of loss of vision in his left eye. Patient states he felt peripheral vision loss closing in, only able to see centrally, painless. No previous episodes. Not on A/C or aspirin.  Pt denies any headaches, dizziness, fevers, chills, dysarthria, focal weakness, falls.  He presented to the ED, patient was given systematic TPA with no change in vision. Admitted to ICU for monitoring.        PAST MEDICAL & SURGICAL HISTORY:  Hypertension    No significant past surgical history        Hospital Course:    TODAY'S SUBJECTIVE & REVIEW OF SYMPTOMS:     Constitutional WNL   Cardio WNL   Resp WNL   GI WNL  Heme WNL  Endo WNL  Skin WNL  MSK WNL  Neuro left eye vision loss  Cognitive WNL  Psych WNL      MEDICATIONS  (STANDING):  aspirin  chewable 81 milliGRAM(s) Oral daily  chlorhexidine 4% Liquid 1 Application(s) Topical daily  lisinopril 10 milliGRAM(s) Oral once  lisinopril 20 milliGRAM(s) Oral daily  pantoprazole    Tablet 40 milliGRAM(s) Oral before breakfast    MEDICATIONS  (PRN):  aluminum hydroxide/magnesium hydroxide/simethicone Suspension 30 milliLiter(s) Oral every 6 hours PRN Dyspepsia      FAMILY HISTORY:      Allergies    No Known Allergies    Intolerances        SOCIAL HISTORY:    [  ] Etoh  [  ] Smoking  [  ] Substance abuse     Home Environment:  [   ] Home Alone  [ x  ] Lives with Family  [   ] Home Health Aid    Dwelling:  [   ] Apartment  [x   ] Private House  [   ] Adult Home  [   ] Skilled Nursing Facility      [   ] Short Term  [   ] Long Term  [ x  ] Stairs       Elevator [   ]    FUNCTIONAL STATUS PTA: (Check all that apply)  Ambulation: [  x  ]Independent    [   ] Dependent     [   ] Non-Ambulatory  Assistive Device: [   ] SA Cane  [   ]  Q Cane  [   ] Walker  [   ]  Wheelchair  ADL : [ x  ] Independent  [    ]  Dependent       Vital Signs Last 24 Hrs  T(C): 35.7 (01 Jul 2021 13:30), Max: 36.5 (30 Jun 2021 20:36)  T(F): 96.2 (01 Jul 2021 13:30), Max: 97.7 (30 Jun 2021 20:36)  HR: 57 (01 Jul 2021 14:43) (53 - 64)  BP: 167/92 (01 Jul 2021 14:50) (141/85 - 171/97)  BP(mean): 127 (01 Jul 2021 14:43) (119 - 127)  RR: 18 (01 Jul 2021 13:30) (18 - 20)  SpO2: 98% (01 Jul 2021 08:00) (98% - 98%)      PHYSICAL EXAM: Awake & Alert  GENERAL: NAD  HEAD:  Normocephalic  CHEST/LUNG: Clear   HEART: S1S2+  ABDOMEN: Soft, Nontender  EXTREMITIES:  no calf tenderness    NERVOUS SYSTEM:  Cranial Nerves 2-12 intact [   ] Abnormal  [   ]  ROM: WFL all extremities [x   ]  Abnormal [   ]  Motor Strength: WFL all extremities  [  x ]  Abnormal [   ]  Sensation: intact to light touch [ x  ] Abnormal [   ]    FUNCTIONAL STATUS:  Bed Mobility: Independent [   ]  Supervision [x   ]  Needs Assistance [   ]  N/A [   ]  Transfers: Independent [   ]  Supervision [  x ]  Needs Assistance [   ]  N/A [   ]   Ambulation: Independent [   ]  Supervision [ x  ]  Needs Assistance [   ]  N/A [   ]  ADL: Independent [   ] Requires Assistance [   ] N/A [   ]      LABS:                        13.7   4.69  )-----------( 147      ( 01 Jul 2021 06:58 )             39.8     07-01    143  |  107  |  12  ----------------------------<  132<H>  3.8   |  28  |  0.8    Ca    8.6      01 Jul 2021 06:58  Phos  3.4     07-01  Mg     1.9     07-01    TPro  6.0  /  Alb  3.9  /  TBili  0.6  /  DBili  x   /  AST  30  /  ALT  26  /  AlkPhos  93  07-01          RADIOLOGY & ADDITIONAL STUDIES:

## 2021-07-01 NOTE — OCCUPATIONAL THERAPY INITIAL EVALUATION ADULT - VISUAL ASSESSMENT: VISUAL FIELD CUTS
Pt able to see blurry shapes from midline to medial field, no vision in midline to lateral field/left

## 2021-07-01 NOTE — PHYSICAL THERAPY INITIAL EVALUATION ADULT - LIVES WITH, PROFILE
Pt lives in [facility], has 12 steps to enter, 5 steps to main level, 1 flight to bedroom/children/spouse

## 2021-07-01 NOTE — PHYSICAL THERAPY INITIAL EVALUATION ADULT - GAIT DISTANCE, PT EVAL
500 ft + , steady gait with external pertubation and  dual tasking activities. Pt educated on scanning L side 2* to visual field loss

## 2021-07-01 NOTE — PHYSICAL THERAPY INITIAL EVALUATION ADULT - GENERAL OBSERVATIONS, REHAB EVAL
Pt was approached for PT IE. Pt currently unavailable. Pt off unit to opthalmology and transfer to . PT will follow up.
9:00-9:34 pt encountered supine in bed in NAD, c/o of blurry vision - decreased vision L visual field

## 2021-07-01 NOTE — OCCUPATIONAL THERAPY INITIAL EVALUATION ADULT - PATIENT PROFILE REVIEW, REHAB EVAL
OT reviewed pt's chart prior to evaluation. Pt agreeable to OT evaluation . Pt seen from 11:00-11:38AM/yes

## 2021-07-05 LAB
% ALBUMIN: 58.9 % — SIGNIFICANT CHANGE UP
% ALPHA 1: 1.8 % — SIGNIFICANT CHANGE UP
% ALPHA 2: 10.7 % — SIGNIFICANT CHANGE UP
% BETA: 13.7 % — SIGNIFICANT CHANGE UP
% GAMMA: 14.9 % — SIGNIFICANT CHANGE UP
ALBUMIN SERPL ELPH-MCNC: 3.5 G/DL — LOW (ref 3.6–5.5)
ALBUMIN/GLOB SERPL ELPH: 1.4 RATIO — SIGNIFICANT CHANGE UP
ALPHA1 GLOB SERPL ELPH-MCNC: 0.1 G/DL — SIGNIFICANT CHANGE UP (ref 0.1–0.4)
ALPHA2 GLOB SERPL ELPH-MCNC: 0.6 G/DL — SIGNIFICANT CHANGE UP (ref 0.5–1)
B-GLOBULIN SERPL ELPH-MCNC: 0.8 G/DL — SIGNIFICANT CHANGE UP (ref 0.5–1)
GAMMA GLOBULIN: 0.9 G/DL — SIGNIFICANT CHANGE UP (ref 0.6–1.6)
PROT PATTERN SERPL ELPH-IMP: SIGNIFICANT CHANGE UP

## 2021-07-12 DIAGNOSIS — R73.03 PREDIABETES: ICD-10-CM

## 2021-07-12 DIAGNOSIS — H34.12 CENTRAL RETINAL ARTERY OCCLUSION, LEFT EYE: ICD-10-CM

## 2021-07-12 DIAGNOSIS — H35.033 HYPERTENSIVE RETINOPATHY, BILATERAL: ICD-10-CM

## 2021-07-12 DIAGNOSIS — I10 ESSENTIAL (PRIMARY) HYPERTENSION: ICD-10-CM

## 2021-07-12 DIAGNOSIS — I80.02 PHLEBITIS AND THROMBOPHLEBITIS OF SUPERFICIAL VESSELS OF LEFT LOWER EXTREMITY: ICD-10-CM

## 2021-07-12 DIAGNOSIS — Z87.891 PERSONAL HISTORY OF NICOTINE DEPENDENCE: ICD-10-CM

## 2021-07-19 ENCOUNTER — TRANSCRIPTION ENCOUNTER (OUTPATIENT)
Age: 61
End: 2021-07-19

## 2021-08-12 PROBLEM — I10 ESSENTIAL (PRIMARY) HYPERTENSION: Chronic | Status: ACTIVE | Noted: 2021-06-28

## 2021-08-15 ENCOUNTER — OUTPATIENT (OUTPATIENT)
Dept: OUTPATIENT SERVICES | Facility: HOSPITAL | Age: 61
LOS: 1 days | Discharge: HOME | End: 2021-08-15

## 2021-08-15 DIAGNOSIS — Z11.59 ENCOUNTER FOR SCREENING FOR OTHER VIRAL DISEASES: ICD-10-CM

## 2021-08-18 ENCOUNTER — TRANSCRIPTION ENCOUNTER (OUTPATIENT)
Age: 61
End: 2021-08-18

## 2021-08-19 ENCOUNTER — TRANSCRIPTION ENCOUNTER (OUTPATIENT)
Age: 61
End: 2021-08-19

## 2021-08-26 ENCOUNTER — TRANSCRIPTION ENCOUNTER (OUTPATIENT)
Age: 61
End: 2021-08-26

## 2021-09-01 NOTE — ED PROVIDER NOTE - HIV OFFER
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  \par -now on anagrelide with some improved platelet countrol\par -hydrea being titrated down, patietn reporting low appetite, losing weight, early satiety, palpable spleen,\par -f/u CT scan done at [facility] she will bring to better understand her carcinoid\par -HU may have been helping control some symptoms and now that we are titrating down perhaps sx from SM may be worsening, would not expect such marked worsening from hu titration however\par \par -still with anemia, not much improved from stopping HU\par -need PET CT and CT CAP results will ask Funmilayo to obtain\par -increase anagrelide to 2mg bid with decrease to HU daily one tablet\par \par  Opt out

## 2021-10-02 ENCOUNTER — OUTPATIENT (OUTPATIENT)
Dept: OUTPATIENT SERVICES | Facility: HOSPITAL | Age: 61
LOS: 1 days | Discharge: HOME | End: 2021-10-02

## 2021-10-02 DIAGNOSIS — Z11.59 ENCOUNTER FOR SCREENING FOR OTHER VIRAL DISEASES: ICD-10-CM

## 2021-10-05 ENCOUNTER — OUTPATIENT (OUTPATIENT)
Dept: OUTPATIENT SERVICES | Facility: HOSPITAL | Age: 61
LOS: 1 days | Discharge: HOME | End: 2021-10-05

## 2021-10-05 DIAGNOSIS — I67.81 ACUTE CEREBROVASCULAR INSUFFICIENCY: ICD-10-CM

## 2021-10-05 LAB
ANION GAP SERPL CALC-SCNC: 13 MMOL/L — SIGNIFICANT CHANGE UP (ref 7–14)
BUN SERPL-MCNC: 9 MG/DL — LOW (ref 10–20)
CALCIUM SERPL-MCNC: 9.1 MG/DL — SIGNIFICANT CHANGE UP (ref 8.5–10.1)
CHLORIDE SERPL-SCNC: 103 MMOL/L — SIGNIFICANT CHANGE UP (ref 98–110)
CO2 SERPL-SCNC: 26 MMOL/L — SIGNIFICANT CHANGE UP (ref 17–32)
CREAT SERPL-MCNC: 0.9 MG/DL — SIGNIFICANT CHANGE UP (ref 0.7–1.5)
GLUCOSE SERPL-MCNC: 121 MG/DL — HIGH (ref 70–99)
HCT VFR BLD CALC: 44.5 % — SIGNIFICANT CHANGE UP (ref 42–52)
HGB BLD-MCNC: 15.8 G/DL — SIGNIFICANT CHANGE UP (ref 14–18)
MCHC RBC-ENTMCNC: 31.8 PG — HIGH (ref 27–31)
MCHC RBC-ENTMCNC: 35.5 G/DL — SIGNIFICANT CHANGE UP (ref 32–37)
MCV RBC AUTO: 89.5 FL — SIGNIFICANT CHANGE UP (ref 80–94)
NRBC # BLD: 0 /100 WBCS — SIGNIFICANT CHANGE UP (ref 0–0)
PLATELET # BLD AUTO: 172 K/UL — SIGNIFICANT CHANGE UP (ref 130–400)
POTASSIUM SERPL-MCNC: 4.4 MMOL/L — SIGNIFICANT CHANGE UP (ref 3.5–5)
POTASSIUM SERPL-SCNC: 4.4 MMOL/L — SIGNIFICANT CHANGE UP (ref 3.5–5)
RBC # BLD: 4.97 M/UL — SIGNIFICANT CHANGE UP (ref 4.7–6.1)
RBC # FLD: 12.6 % — SIGNIFICANT CHANGE UP (ref 11.5–14.5)
SODIUM SERPL-SCNC: 142 MMOL/L — SIGNIFICANT CHANGE UP (ref 135–146)
WBC # BLD: 4.94 K/UL — SIGNIFICANT CHANGE UP (ref 4.8–10.8)
WBC # FLD AUTO: 4.94 K/UL — SIGNIFICANT CHANGE UP (ref 4.8–10.8)

## 2021-10-05 NOTE — H&P CARDIOLOGY - HISTORY OF PRESENT ILLNESS
HPI  60 y/o male with PMHx of HTN and CVA presents for elective ROMULO. patient was recently diagnosed with CVA in July 2021 and coming to rule out cardioembolic source. currently feels fine with no complaints      REVIEW OF SYSTEMS:  CONSTITUTIONAL: No weakness, fevers or chills  EYES/ENT: No visual changes;  No vertigo or throat pain   NECK: No pain or stiffness  RESPIRATORY: No cough, wheezing, hemoptysis; SEE HPI  CARDIOVASCULAR: SEE HPI  GASTROINTESTINAL: No abdominal or epigastric pain. No nausea, vomiting, or hematemesis; No diarrhea or constipation. No melena or hematochezia.  GENITOURINARY: No dysuria, frequency or hematuria  NEUROLOGICAL: No numbness or weakness  SKIN: No itching, rashes      PHYSICAL EXAM:  T(C): --  HR: --  BP: --  RR: --  SpO2: --  GENERAL: NAD  HEAD:  Atraumatic, Normocephalic  EYES: conjunctiva and sclera clear  NECK: No JVD  CHEST/LUNG: Clear to auscultation bilaterally; No wheeze  HEART: Regular rate and rhythm;   ABDOMEN: Soft, Nontender, Nondistended; Bowel sounds present  EXTREMITIES:  2+ Peripheral Pulses, No clubbing, cyanosis, or edema  NEUROLOGY:  A&Ox3, appropriate  SKIN: No rashes or lesions
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
## 2021-10-05 NOTE — ASU PATIENT PROFILE, ADULT - ABUSE SCREEN COMMENT, PROFILE
Assumed care of pt at 1930 eating dinner but sitting in bed. Monitor shows sinus rhythm with frequent PAC's. Ramos intact with yellow urine and statlock in place - with adequate output. PIV maintained as well as LUE midline which does not draw.   He is st na

## 2021-10-23 ENCOUNTER — TRANSCRIPTION ENCOUNTER (OUTPATIENT)
Age: 61
End: 2021-10-23

## 2021-11-04 PROBLEM — G45.9 TRANSIENT CEREBRAL ISCHEMIC ATTACK, UNSPECIFIED: Chronic | Status: ACTIVE | Noted: 2021-10-05

## 2021-12-09 ENCOUNTER — APPOINTMENT (OUTPATIENT)
Dept: GASTROENTEROLOGY | Facility: CLINIC | Age: 61
End: 2021-12-09
Payer: COMMERCIAL

## 2021-12-09 VITALS — WEIGHT: 222.4 LBS | HEIGHT: 71 IN | BODY MASS INDEX: 31.14 KG/M2

## 2021-12-09 DIAGNOSIS — R14.0 ABDOMINAL DISTENSION (GASEOUS): ICD-10-CM

## 2021-12-09 PROCEDURE — 99204 OFFICE O/P NEW MOD 45 MIN: CPT

## 2021-12-09 NOTE — HISTORY OF PRESENT ILLNESS
[de-identified] : colon is booked for 12/15/2021\par \par 61-year-old male history of CVA on aspirin hypogonadism erectile dysfunction epistaxis referred from cardiology for a colonoscopy.  Patient had a screening colonoscopy at the age of 53 no records available.  Endorses no family history of colon cancer unclear history of melanoma.  Patient states gets bloating postprandially and occasionally constipation denies bleeding.  Also endorses acidity in the belly.  Patient is on omeprazole for many years.

## 2021-12-09 NOTE — REASON FOR VISIT
[Consultation] : a consultation visit [FreeTextEntry1] : NPA in office visit for screening booked for 12/15/2021 referred by

## 2021-12-09 NOTE — PHYSICAL EXAM
[General Appearance - Alert] : alert [General Appearance - In No Acute Distress] : in no acute distress [Sclera] : the sclera and conjunctiva were normal [Outer Ear] : the ears and nose were normal in appearance [Neck Appearance] : the appearance of the neck was normal [] : no respiratory distress [Abdomen Soft] : soft [Abdomen Tenderness] : non-tender [No CVA Tenderness] : no ~M costovertebral angle tenderness [Abnormal Walk] : normal gait [Oriented To Time, Place, And Person] : oriented to person, place, and time

## 2021-12-09 NOTE — ASSESSMENT
[FreeTextEntry1] : 61-year-old male CVA on ASA past due for CRC screening endorses abdominal pain bloating possibly IBS.  Also endorses chronic GERD.\par \par Plan\par Continue omeprazole\par EGD\par Colonoscopy\par Follow-up after the above.

## 2021-12-12 ENCOUNTER — LABORATORY RESULT (OUTPATIENT)
Age: 61
End: 2021-12-12

## 2021-12-15 ENCOUNTER — TRANSCRIPTION ENCOUNTER (OUTPATIENT)
Age: 61
End: 2021-12-15

## 2021-12-15 ENCOUNTER — OUTPATIENT (OUTPATIENT)
Dept: OUTPATIENT SERVICES | Facility: HOSPITAL | Age: 61
LOS: 1 days | Discharge: HOME | End: 2021-12-15
Payer: COMMERCIAL

## 2021-12-15 ENCOUNTER — RESULT REVIEW (OUTPATIENT)
Age: 61
End: 2021-12-15

## 2021-12-15 VITALS
OXYGEN SATURATION: 98 % | HEART RATE: 60 BPM | SYSTOLIC BLOOD PRESSURE: 130 MMHG | RESPIRATION RATE: 16 BRPM | DIASTOLIC BLOOD PRESSURE: 74 MMHG

## 2021-12-15 VITALS — HEIGHT: 72 IN | WEIGHT: 214.95 LBS

## 2021-12-15 DIAGNOSIS — Z90.49 ACQUIRED ABSENCE OF OTHER SPECIFIED PARTS OF DIGESTIVE TRACT: Chronic | ICD-10-CM

## 2021-12-15 DIAGNOSIS — Z98.890 OTHER SPECIFIED POSTPROCEDURAL STATES: Chronic | ICD-10-CM

## 2021-12-15 PROCEDURE — 88312 SPECIAL STAINS GROUP 1: CPT | Mod: 26

## 2021-12-15 PROCEDURE — 45385 COLONOSCOPY W/LESION REMOVAL: CPT

## 2021-12-15 PROCEDURE — 43235 EGD DIAGNOSTIC BRUSH WASH: CPT | Mod: XS

## 2021-12-15 PROCEDURE — 45390 COLONOSCOPY W/RESECTION: CPT | Mod: 59

## 2021-12-15 PROCEDURE — 88305 TISSUE EXAM BY PATHOLOGIST: CPT | Mod: 26

## 2021-12-15 PROCEDURE — 45380 COLONOSCOPY AND BIOPSY: CPT | Mod: XU

## 2021-12-15 RX ORDER — QUINAPRIL HYDROCHLORIDE 40 MG/1
0 TABLET, FILM COATED ORAL
Qty: 0 | Refills: 1 | DISCHARGE

## 2021-12-15 RX ORDER — HYDROCHLOROTHIAZIDE 25 MG
0 TABLET ORAL
Qty: 0 | Refills: 1 | DISCHARGE

## 2021-12-15 NOTE — ASU PATIENT PROFILE, ADULT - FALL HARM RISK - UNIVERSAL INTERVENTIONS
Bed in lowest position, wheels locked, appropriate side rails in place/Call bell, personal items and telephone in reach/Instruct patient to call for assistance before getting out of bed or chair/Non-slip footwear when patient is out of bed/[place] to call system/Physically safe environment - no spills, clutter or unnecessary equipment/Purposeful Proactive Rounding/Room/bathroom lighting operational, light cord in reach

## 2021-12-15 NOTE — ASU DISCHARGE PLAN (ADULT/PEDIATRIC) - NS MD DC FALL RISK RISK
For information on Fall & Injury Prevention, visit: [email or web address] OR  [email or web address] OR  https://www.cdc.gov/steadi/patient.html

## 2021-12-15 NOTE — PRE-ANESTHESIA EVALUATION ADULT - NSANTHOSAYNRD_GEN_A_CORE
No. CLARK screening performed.  STOP BANG Legend: 0-2 = LOW Risk; 3-4 = INTERMEDIATE Risk; 5-8 = HIGH Risk

## 2021-12-17 LAB
SURGICAL PATHOLOGY STUDY: SIGNIFICANT CHANGE UP
SURGICAL PATHOLOGY STUDY: SIGNIFICANT CHANGE UP

## 2021-12-20 DIAGNOSIS — K29.80 DUODENITIS WITHOUT BLEEDING: ICD-10-CM

## 2021-12-20 DIAGNOSIS — K64.8 OTHER HEMORRHOIDS: ICD-10-CM

## 2021-12-20 DIAGNOSIS — D12.0 BENIGN NEOPLASM OF CECUM: ICD-10-CM

## 2021-12-20 DIAGNOSIS — K64.4 RESIDUAL HEMORRHOIDAL SKIN TAGS: ICD-10-CM

## 2021-12-20 DIAGNOSIS — K31.7 POLYP OF STOMACH AND DUODENUM: ICD-10-CM

## 2021-12-20 DIAGNOSIS — K20.90 ESOPHAGITIS, UNSPECIFIED WITHOUT BLEEDING: ICD-10-CM

## 2021-12-20 DIAGNOSIS — I10 ESSENTIAL (PRIMARY) HYPERTENSION: ICD-10-CM

## 2021-12-20 DIAGNOSIS — K62.1 RECTAL POLYP: ICD-10-CM

## 2021-12-20 DIAGNOSIS — K52.9 NONINFECTIVE GASTROENTERITIS AND COLITIS, UNSPECIFIED: ICD-10-CM

## 2021-12-20 DIAGNOSIS — K29.50 UNSPECIFIED CHRONIC GASTRITIS WITHOUT BLEEDING: ICD-10-CM

## 2021-12-20 DIAGNOSIS — Z79.82 LONG TERM (CURRENT) USE OF ASPIRIN: ICD-10-CM

## 2021-12-20 DIAGNOSIS — K21.9 GASTRO-ESOPHAGEAL REFLUX DISEASE WITHOUT ESOPHAGITIS: ICD-10-CM

## 2022-03-14 ENCOUNTER — NON-APPOINTMENT (OUTPATIENT)
Age: 62
End: 2022-03-14

## 2022-05-02 ENCOUNTER — APPOINTMENT (OUTPATIENT)
Dept: GASTROENTEROLOGY | Facility: CLINIC | Age: 62
End: 2022-05-02
Payer: SELF-PAY

## 2022-05-02 DIAGNOSIS — K21.9 GASTRO-ESOPHAGEAL REFLUX DISEASE W/OUT ESOPHAGITIS: ICD-10-CM

## 2022-05-02 PROCEDURE — 99443: CPT

## 2022-05-02 RX ORDER — OMEPRAZOLE 40 MG/1
40 CAPSULE, DELAYED RELEASE ORAL TWICE DAILY
Qty: 60 | Refills: 2 | Status: ACTIVE | COMMUNITY
Start: 2022-05-02 | End: 1900-01-01

## 2022-05-02 NOTE — REASON FOR VISIT
[Follow-Up: _____] : a [unfilled] follow-up visit [FreeTextEntry1] : F/U -Still having the same issues- wants to try new medication

## 2022-05-02 NOTE — ASSESSMENT
[FreeTextEntry1] : Chronic GERD\par HRA\par \par plan:\par Increase omeprazole to 40 BID\par Weight loss and diet adjustement\par revisit in 3 months\par colonosocpy in 3 years\par May consider barvo in 3 months

## 2022-05-02 NOTE — HISTORY OF PRESENT ILLNESS
[Home] : at home, [unfilled] , at the time of the visit. [Verbal consent obtained from patient] : the patient, [unfilled] [___ Month(s) Ago] : [unfilled] month(s) ago [_________] : Performed [unfilled] [FreeTextEntry4] : Lynne [de-identified] : 60 yo M following up after EGD and colonosocpy. COntinues to endorse GERD partially improved with omeprazole. EGD: no HP no barretts fundic gland polyp. Colonosoocpy: multiple polyps on path 1 TA 1cm TA. GERD and epigatsric pain better with no spicy food.

## 2022-08-08 ENCOUNTER — APPOINTMENT (OUTPATIENT)
Dept: GASTROENTEROLOGY | Facility: CLINIC | Age: 62
End: 2022-08-08

## 2022-09-13 ENCOUNTER — NON-APPOINTMENT (OUTPATIENT)
Age: 62
End: 2022-09-13

## 2022-09-14 ENCOUNTER — TRANSCRIPTION ENCOUNTER (OUTPATIENT)
Age: 62
End: 2022-09-14

## 2022-09-16 ENCOUNTER — OUTPATIENT (OUTPATIENT)
Dept: INPATIENT UNIT | Facility: HOSPITAL | Age: 62
LOS: 1 days | Discharge: HOME | End: 2022-09-16

## 2022-09-16 ENCOUNTER — APPOINTMENT (OUTPATIENT)
Age: 62
End: 2022-09-16

## 2022-09-16 VITALS
SYSTOLIC BLOOD PRESSURE: 118 MMHG | OXYGEN SATURATION: 96 % | RESPIRATION RATE: 16 BRPM | HEART RATE: 64 BPM | TEMPERATURE: 98 F | DIASTOLIC BLOOD PRESSURE: 76 MMHG

## 2022-09-16 VITALS
SYSTOLIC BLOOD PRESSURE: 115 MMHG | HEART RATE: 67 BPM | DIASTOLIC BLOOD PRESSURE: 74 MMHG | TEMPERATURE: 99 F | RESPIRATION RATE: 16 BRPM | OXYGEN SATURATION: 96 %

## 2022-09-16 DIAGNOSIS — U07.1 COVID-19: ICD-10-CM

## 2022-09-16 DIAGNOSIS — Z90.49 ACQUIRED ABSENCE OF OTHER SPECIFIED PARTS OF DIGESTIVE TRACT: Chronic | ICD-10-CM

## 2022-09-16 DIAGNOSIS — Z98.890 OTHER SPECIFIED POSTPROCEDURAL STATES: Chronic | ICD-10-CM

## 2022-09-16 RX ORDER — BEBTELOVIMAB 87.5 MG/ML
175 INJECTION, SOLUTION INTRAVENOUS ONCE
Refills: 0 | Status: COMPLETED | OUTPATIENT
Start: 2022-09-16 | End: 2022-09-16

## 2022-09-16 RX ADMIN — BEBTELOVIMAB 175 MILLIGRAM(S): 87.5 INJECTION, SOLUTION INTRAVENOUS at 11:28

## 2022-09-16 NOTE — CHART NOTE - NSCHARTNOTEFT_GEN_A_CORE
HPI: Patient is a 61yo Male w/ PMH of HTN, TIA presented today for monoclonal antibody treatment with Bebtelovimab. Pt was tested positive 2 days ago, and has sore throat currently. No allergies. Pt received 1 covid vaccines, last dosage not in past 14 days. Pt received covid antibody infusion last year. Hemodynamically stable.      PAST MEDICAL & SURGICAL HISTORY:  Hypertension  Transient cerebral ischemic attack  History of appendectomy      Allergies  No Known Allergies      T(C): 36.4 (16 Sep 2022 12:15), Max: 36.5 (16 Sep 2022 11:28)  T(F): 97.6 (16 Sep 2022 12:15), Max: 97.7 (16 Sep 2022 11:28)  HR: 61 (16 Sep 2022 12:15) (61 - 64)  BP: 112/80 (16 Sep 2022 12:15) (112/80 - 118/76)  RR: 15 (16 Sep 2022 12:15) (15 - 16)  SpO2: 95% (16 Sep 2022 12:15) (95% - 96%)      Physical Examination:  General: No acute distress.  Alert, oriented, interactive, nonfocal  PULM: Clear to auscultation bilaterally, no significant sputum production  CVS: Regular rate, no murmurs, rubs, or gallops  ABD: Soft, nondistended, nontender, normoactive bowel sounds, no masses  EXT: No edema, nontender  SKIN: Warm and well perfused, no rashes noted.      Plan:  I have reviewed the Bebtelovimab Emergency Use Authorization (EUA) and I have provided the patient or patient's caregiver with the following information:  1. FDA has authorized emergency use of Bebtelovimab which is not an FDA approved biological agent.  2. The patient or patient’s caregiver has the option to accept or refuse administration of Bebtelovimab  3. The significant known and potential risks and benefits of Bebtelovimab and the extent to which such risks and benefits are unknown.  4. Information on available alternative treatments and risks and benefits of those alternatives.  Informed consent was obtained. Answered all of patient's questions and concerns to their satisfaction. Patient verbalized understanding.  Monitor VS per protocol.  Insert peripheral IV.  Infuse NSS 25/mL IV continuously.  Administer Bebtelovimab IVP over 30 seconds.  Observe patient for 1 hour post infusion.    Disposition:  Patient tolerated infusion well, denies complaints of chest pain, shortness of breath, dizziness or palpitations. Discharge instructions given and fact sheet included.  Instructed patient to contact the call center or their PMD if they develop side effects at home.  Instructed the patient to call 911 and go to the emergency room if they develop symptoms of a severe allergic reaction. Patient verbalized understanding.  RN educated patient on the proper use of the incentive spirometer and the patient displayed return demonstration.  VSS and RN removed IV successfully.  Patient was discharged home in [facility].

## 2022-09-22 ENCOUNTER — NON-APPOINTMENT (OUTPATIENT)
Age: 62
End: 2022-09-22

## 2022-10-06 ENCOUNTER — NON-APPOINTMENT (OUTPATIENT)
Age: 62
End: 2022-10-06

## 2022-10-26 NOTE — ED ADULT NURSE NOTE - PERIPHERAL PULSES
Patient is a 59yo gentleman with with PMH of primary myelofibrosis who presents with leukocytosis and 19% peripheral blasts concerning for transformation to acute leukemia.        equal bilaterally 60 year old prescribed Jakafi and HU for primary myelofibrosis now admitted for disease progression after stopping medications for three days.

## 2022-11-30 NOTE — CONSULT NOTE ADULT - ASSESSMENT
IMPRESSION: Rehab of left eye vision loss due to retinal artery occlusion    PRECAUTIONS: [   ] Cardiac  [   ] Respiratory  [   ] Seizures [   ] Contact Isolation  [   ] Droplet Isolation  [   ] Other    Weight Bearing Status:     RECOMMENDATION:    Out of Bed to Chair     DVT/Decubiti Prophylaxis    REHAB PLAN:     [  x  ] Bedside P/T 3-5 times a week   [ x   ]   Bedside O/T  2-3 times a week             [    ] Speech Therapy               [    ]  No Rehab Therapy Indicated   Conditioning/ROM                                    ADL  Bed Mobility                                               Conditioning/ROM  Transfers                                                     Bed Mobility  Sitting /Standing Balance                         Transfers                                        Gait Training                                               Sitting/Standing Balance  Stair Training [   ]Applicable                    Home equipment Eval                                                                        Splinting  [   ] Only      GOALS:   ADL   [ x   ]   Independent                    Transfers  [x    ] Independent                          Ambulation  [    x] Independent     [ x    ] With device                            [    ]  CG                                                         [    ]  CG                                                                  [    ] CG                            [    ] Min A                                                   [    ] Min A                                                              [    ] Min  A          DISCHARGE PLAN:   [    ]  Good candidate for Intensive Rehabilitation/Hospital based                                             Will tolerate 3hrs Intensive Rehab Daily                                       [     ]  Short Term Rehab in Skilled Nursing Facility                                       [ x    ]  Home with Outpatient or VN services - outpatient OT                                         [     ]  Possible Candidate for Intensive Hospital based Rehab                                        No

## 2023-04-18 NOTE — ED ADULT NURSE NOTE - NS ED NOTE ABUSE SUSPICION NEGLECT YN
[FreeTextEntry1] : CPE for 57 year old WF with PMH as stated in HPI / active list. \par \par Management : \par \par See HPI and Plan.\par \par Labs in office today, will advise. Medications to be reconciled.\par \par Advised to schedule colonoscopy for winter 2023!\par \par Encouraged Podiatry consult with Dr. Monzon, follow up as needed. \par \par Continue to follow up with appropriate specialists. \par \par Importance of daily 30 minute walk stressed for exercise.  Other forms of exercise such as weight lifting encouraged as well. Importance of drinking 60-80 oz of FREE WATER daily stressed. \par \par Continue current medication regimens. Continue healthy lifestyle choices! \par \par \par \par Best wishes offered! \par 
No

## 2023-08-13 NOTE — ASU PATIENT PROFILE, ADULT - SPIRITUAL CULTURAL, CURRENT SITUATION, PROFILE
Refill Routing Note   Medication(s) are not appropriate for processing by [facility] for the following reason(s):      No active prescription written by PCP    [facility] action(s):  Defer Care Due:  None identified          Appointments  past 12m or future 3m with PCP    Date Provider   Last Visit   6/5/2023 Ivanna Harley MD   Next Visit   12/5/2023 Ivanna Harley MD   ED visits in past 90 days: 0        Note composed:5:44 PM 08/13/2023            jhoana

## 2023-08-25 NOTE — ED ADULT NURSE NOTE - TEMPLATE LIST FOR HEAD TO TOE ASSESSMENT
EENMT Olanzapine Counseling- I discussed with the patient the common side effects of olanzapine including but are not limited to: lack of energy, dry mouth, increased appetite, sleepiness, tremor, constipation, dizziness, changes in behavior, or restlessness.  Explained that teenagers are more likely to experience headaches, abdominal pain, pain in the arms or legs, tiredness, and sleepiness.  Serious side effects include but are not limited: increased risk of death in elderly patients who are confused, have memory loss, or dementia-related psychosis; hyperglycemia; increased cholesterol and triglycerides; and weight gain.

## 2023-09-06 NOTE — ED ADULT NURSE NOTE - NS ED NOTE ABUSE RESPONSE YN
[address] [facility] coding opportunities       Chart reviewed, no opportunity found: [address]        Patients Insurance     Medicare Insurance: [employer] Advantage
Yes

## 2024-07-06 ENCOUNTER — NON-APPOINTMENT (OUTPATIENT)
Age: 64
End: 2024-07-06

## 2024-07-17 ENCOUNTER — EMERGENCY (EMERGENCY)
Facility: HOSPITAL | Age: 64
LOS: 0 days | Discharge: HOME | End: 2024-07-17
Attending: EMERGENCY MEDICINE
Payer: COMMERCIAL

## 2024-07-17 VITALS
RESPIRATION RATE: 18 BRPM | DIASTOLIC BLOOD PRESSURE: 95 MMHG | HEART RATE: 66 BPM | HEIGHT: 71 IN | TEMPERATURE: 98 F | SYSTOLIC BLOOD PRESSURE: 156 MMHG | OXYGEN SATURATION: 96 % | WEIGHT: 214.95 LBS

## 2024-07-17 DIAGNOSIS — R20.2 PARESTHESIA OF SKIN: ICD-10-CM
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
## 2024-07-17 DIAGNOSIS — R22.0 LOCALIZED SWELLING, MASS AND LUMP, HEAD: ICD-10-CM

## 2024-07-17 DIAGNOSIS — S81.802A UNSPECIFIED OPEN WOUND, LEFT LOWER LEG, INITIAL ENCOUNTER: ICD-10-CM

## 2024-07-17 DIAGNOSIS — I10 ESSENTIAL (PRIMARY) HYPERTENSION: ICD-10-CM
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
## 2024-07-17 DIAGNOSIS — Z86.73 PERSONAL HISTORY OF TRANSIENT ISCHEMIC ATTACK (TIA), AND CEREBRAL INFARCTION WITHOUT RESIDUAL DEFICITS: ICD-10-CM

## 2024-07-17 DIAGNOSIS — Z98.890 OTHER SPECIFIED POSTPROCEDURAL STATES: Chronic | ICD-10-CM

## 2024-07-17 DIAGNOSIS — R20.0 ANESTHESIA OF SKIN: ICD-10-CM

## 2024-07-17 DIAGNOSIS — M79.89 OTHER SPECIFIED SOFT TISSUE DISORDERS: ICD-10-CM

## 2024-07-17 DIAGNOSIS — Z90.49 ACQUIRED ABSENCE OF OTHER SPECIFIED PARTS OF DIGESTIVE TRACT: Chronic | ICD-10-CM

## 2024-07-17 LAB
ALBUMIN SERPL ELPH-MCNC: 4.1 G/DL — SIGNIFICANT CHANGE UP (ref 3.5–5.2)
ALP SERPL-CCNC: 93 U/L — SIGNIFICANT CHANGE UP (ref 30–115)
ALT FLD-CCNC: 42 U/L — HIGH (ref 0–41)
ANION GAP SERPL CALC-SCNC: 8 MMOL/L — SIGNIFICANT CHANGE UP (ref 7–14)
AST SERPL-CCNC: 39 U/L — SIGNIFICANT CHANGE UP (ref 0–41)
BASOPHILS # BLD AUTO: 0.04 K/UL — SIGNIFICANT CHANGE UP (ref 0–0.2)
BASOPHILS NFR BLD AUTO: 0.7 % — SIGNIFICANT CHANGE UP (ref 0–1)
BILIRUB SERPL-MCNC: 0.9 MG/DL — SIGNIFICANT CHANGE UP (ref 0.2–1.2)
BUN SERPL-MCNC: 19 MG/DL — SIGNIFICANT CHANGE UP (ref 10–20)
CALCIUM SERPL-MCNC: 9.2 MG/DL — SIGNIFICANT CHANGE UP (ref 8.4–10.5)
CHLORIDE SERPL-SCNC: 108 MMOL/L — SIGNIFICANT CHANGE UP (ref 98–110)
CO2 SERPL-SCNC: 25 MMOL/L — SIGNIFICANT CHANGE UP (ref 17–32)
CREAT SERPL-MCNC: 0.7 MG/DL — SIGNIFICANT CHANGE UP (ref 0.7–1.5)
EGFR: 104 ML/MIN/1.73M2 — SIGNIFICANT CHANGE UP
EOSINOPHIL # BLD AUTO: 0.11 K/UL — SIGNIFICANT CHANGE UP (ref 0–0.7)
EOSINOPHIL NFR BLD AUTO: 1.9 % — SIGNIFICANT CHANGE UP (ref 0–8)
GLUCOSE SERPL-MCNC: 111 MG/DL — HIGH (ref 70–99)
HCT VFR BLD CALC: 37.9 % — LOW (ref 42–52)
HGB BLD-MCNC: 13 G/DL — LOW (ref 14–18)
IMM GRANULOCYTES NFR BLD AUTO: 0.2 % — SIGNIFICANT CHANGE UP (ref 0.1–0.3)
LYMPHOCYTES # BLD AUTO: 1.58 K/UL — SIGNIFICANT CHANGE UP (ref 1.2–3.4)
LYMPHOCYTES # BLD AUTO: 26.7 % — SIGNIFICANT CHANGE UP (ref 20.5–51.1)
MCHC RBC-ENTMCNC: 31.7 PG — HIGH (ref 27–31)
MCHC RBC-ENTMCNC: 34.3 G/DL — SIGNIFICANT CHANGE UP (ref 32–37)
MCV RBC AUTO: 92.4 FL — SIGNIFICANT CHANGE UP (ref 80–94)
MONOCYTES # BLD AUTO: 0.59 K/UL — SIGNIFICANT CHANGE UP (ref 0.1–0.6)
MONOCYTES NFR BLD AUTO: 10 % — HIGH (ref 1.7–9.3)
NEUTROPHILS # BLD AUTO: 3.59 K/UL — SIGNIFICANT CHANGE UP (ref 1.4–6.5)
NEUTROPHILS NFR BLD AUTO: 60.5 % — SIGNIFICANT CHANGE UP (ref 42.2–75.2)
NRBC # BLD: 0 /100 WBCS — SIGNIFICANT CHANGE UP (ref 0–0)
PLATELET # BLD AUTO: 176 K/UL — SIGNIFICANT CHANGE UP (ref 130–400)
PMV BLD: 9.9 FL — SIGNIFICANT CHANGE UP (ref 7.4–10.4)
POTASSIUM SERPL-MCNC: 4.3 MMOL/L — SIGNIFICANT CHANGE UP (ref 3.5–5)
POTASSIUM SERPL-SCNC: 4.3 MMOL/L — SIGNIFICANT CHANGE UP (ref 3.5–5)
PROT SERPL-MCNC: 6.7 G/DL — SIGNIFICANT CHANGE UP (ref 6–8)
RBC # BLD: 4.1 M/UL — LOW (ref 4.7–6.1)
RBC # FLD: 13.1 % — SIGNIFICANT CHANGE UP (ref 11.5–14.5)
SODIUM SERPL-SCNC: 141 MMOL/L — SIGNIFICANT CHANGE UP (ref 135–146)
WBC # BLD: 5.92 K/UL — SIGNIFICANT CHANGE UP (ref 4.8–10.8)
WBC # FLD AUTO: 5.92 K/UL — SIGNIFICANT CHANGE UP (ref 4.8–10.8)

## 2024-07-17 PROCEDURE — 85025 COMPLETE CBC W/AUTO DIFF WBC: CPT

## 2024-07-17 PROCEDURE — 99284 EMERGENCY DEPT VISIT MOD MDM: CPT | Mod: 25

## 2024-07-17 PROCEDURE — 99285 EMERGENCY DEPT VISIT HI MDM: CPT

## 2024-07-17 PROCEDURE — 93971 EXTREMITY STUDY: CPT | Mod: 26,LT

## 2024-07-17 PROCEDURE — 93971 EXTREMITY STUDY: CPT | Mod: LT

## 2024-07-17 PROCEDURE — 80053 COMPREHEN METABOLIC PANEL: CPT

## 2024-07-17 PROCEDURE — 87040 BLOOD CULTURE FOR BACTERIA: CPT

## 2024-07-17 PROCEDURE — 36415 COLL VENOUS BLD VENIPUNCTURE: CPT

## 2024-07-17 RX ORDER — DOXYCYCLINE 100 MG/1
100 CAPSULE ORAL ONCE
Refills: 0 | Status: COMPLETED | OUTPATIENT
Start: 2024-07-17 | End: 2024-07-17

## 2024-07-17 RX ORDER — DOXYCYCLINE 100 MG/1
1 CAPSULE ORAL
Qty: 14 | Refills: 0
Start: 2024-07-17 | End: 2024-07-23

## 2024-07-17 RX ADMIN — DOXYCYCLINE 100 MILLIGRAM(S): 100 CAPSULE ORAL at 22:26

## 2024-07-23 LAB
CULTURE RESULTS: SIGNIFICANT CHANGE UP
CULTURE RESULTS: SIGNIFICANT CHANGE UP
SPECIMEN SOURCE: SIGNIFICANT CHANGE UP
SPECIMEN SOURCE: SIGNIFICANT CHANGE UP

## 2024-07-25 ENCOUNTER — APPOINTMENT (OUTPATIENT)
Dept: BURN CARE | Facility: CLINIC | Age: 64
End: 2024-07-25
Payer: COMMERCIAL

## 2024-07-25 VITALS — DIASTOLIC BLOOD PRESSURE: 80 MMHG | SYSTOLIC BLOOD PRESSURE: 121 MMHG

## 2024-07-25 DIAGNOSIS — T30.0 BURN OF UNSPECIFIED BODY REGION, UNSPECIFIED DEGREE: ICD-10-CM

## 2024-07-25 PROCEDURE — 99203 OFFICE O/P NEW LOW 30 MIN: CPT

## 2024-07-25 RX ORDER — SILVER SULFADIAZINE 10 MG/G
1 CREAM TOPICAL TWICE DAILY
Qty: 400 | Refills: 3 | Status: ACTIVE | COMMUNITY
Start: 2024-07-25 | End: 1900-01-01
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
## 2024-08-08 ENCOUNTER — APPOINTMENT (OUTPATIENT)
Dept: BURN CARE | Facility: CLINIC | Age: 64
End: 2024-08-08

## 2024-08-08 ENCOUNTER — OUTPATIENT (OUTPATIENT)
Dept: OUTPATIENT SERVICES | Facility: HOSPITAL | Age: 64
LOS: 1 days | End: 2024-08-08
Payer: COMMERCIAL

## 2024-08-08 DIAGNOSIS — Z98.890 OTHER SPECIFIED POSTPROCEDURAL STATES: Chronic | ICD-10-CM

## 2024-08-08 DIAGNOSIS — Z90.49 ACQUIRED ABSENCE OF OTHER SPECIFIED PARTS OF DIGESTIVE TRACT: Chronic | ICD-10-CM

## 2024-08-08 DIAGNOSIS — Z00.8 ENCOUNTER FOR OTHER GENERAL EXAMINATION: ICD-10-CM

## 2024-08-08 PROCEDURE — 16020 DRESS/DEBRID P-THICK BURN S: CPT
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
## 2024-08-15 PROBLEM — T24.332A: Status: ACTIVE | Noted: 2024-08-15
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
## 2024-08-16 DIAGNOSIS — T31.0 BURNS INVOLVING LESS THAN 10% OF BODY SURFACE: ICD-10-CM
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
## 2024-08-16 DIAGNOSIS — T24.332A BURN OF THIRD DEGREE OF LEFT LOWER LEG, INITIAL ENCOUNTER: ICD-10-CM

## 2024-08-22 ENCOUNTER — APPOINTMENT (OUTPATIENT)
Dept: BURN CARE | Facility: CLINIC | Age: 64
End: 2024-08-22
Payer: COMMERCIAL

## 2024-08-22 VITALS
SYSTOLIC BLOOD PRESSURE: 156 MMHG | BODY MASS INDEX: 30.8 KG/M2 | HEIGHT: 71 IN | WEIGHT: 220 LBS | HEART RATE: 71 BPM | DIASTOLIC BLOOD PRESSURE: 91 MMHG

## 2024-08-22 PROCEDURE — 99213 OFFICE O/P EST LOW 20 MIN: CPT
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
## 2024-09-12 ENCOUNTER — APPOINTMENT (OUTPATIENT)
Dept: BURN CARE | Facility: CLINIC | Age: 64
End: 2024-09-12

## 2024-10-01 ENCOUNTER — APPOINTMENT (OUTPATIENT)
Dept: BURN CARE | Facility: CLINIC | Age: 64
End: 2024-10-01

## 2024-10-08 ENCOUNTER — APPOINTMENT (OUTPATIENT)
Dept: BURN CARE | Facility: CLINIC | Age: 64
End: 2024-10-08
Payer: COMMERCIAL

## 2024-10-08 ENCOUNTER — OUTPATIENT (OUTPATIENT)
Dept: OUTPATIENT SERVICES | Facility: HOSPITAL | Age: 64
LOS: 1 days | Discharge: HOME | End: 2024-10-08
Payer: COMMERCIAL

## 2024-10-08 VITALS
DIASTOLIC BLOOD PRESSURE: 77 MMHG | BODY MASS INDEX: 34.87 KG/M2 | WEIGHT: 250 LBS | HEART RATE: 67 BPM | SYSTOLIC BLOOD PRESSURE: 113 MMHG | OXYGEN SATURATION: 96 %

## 2024-10-08 DIAGNOSIS — Z98.890 OTHER SPECIFIED POSTPROCEDURAL STATES: Chronic | ICD-10-CM

## 2024-10-08 DIAGNOSIS — Z90.49 ACQUIRED ABSENCE OF OTHER SPECIFIED PARTS OF DIGESTIVE TRACT: Chronic | ICD-10-CM

## 2024-10-08 DIAGNOSIS — Z00.8 ENCOUNTER FOR OTHER GENERAL EXAMINATION: ICD-10-CM

## 2024-10-08 PROCEDURE — 99213 OFFICE O/P EST LOW 20 MIN: CPT

## 2024-10-14 NOTE — ED PROVIDER NOTE - CLINICAL SUMMARY MEDICAL DECISION MAKING FREE TEXT BOX
weakness 58 yo male with nasal packing and vertigo like symptoms, neurologically intact, packing left in place, has appointment with [facility] tomorrow at 11 AM.

## 2024-10-15 DIAGNOSIS — T24.332D BURN OF THIRD DEGREE OF LEFT LOWER LEG, SUBSEQUENT ENCOUNTER: ICD-10-CM

## 2024-11-07 ENCOUNTER — APPOINTMENT (OUTPATIENT)
Dept: BURN CARE | Facility: CLINIC | Age: 64
End: 2024-11-07
Payer: COMMERCIAL

## 2024-11-07 ENCOUNTER — OUTPATIENT (OUTPATIENT)
Dept: OUTPATIENT SERVICES | Facility: HOSPITAL | Age: 64
LOS: 1 days | End: 2024-11-07
Payer: COMMERCIAL

## 2024-11-07 VITALS
DIASTOLIC BLOOD PRESSURE: 74 MMHG | WEIGHT: 250 LBS | HEART RATE: 94 BPM | BODY MASS INDEX: 34.87 KG/M2 | SYSTOLIC BLOOD PRESSURE: 136 MMHG | OXYGEN SATURATION: 97 %

## 2024-11-07 DIAGNOSIS — Z00.8 ENCOUNTER FOR OTHER GENERAL EXAMINATION: ICD-10-CM

## 2024-11-07 DIAGNOSIS — Z90.49 ACQUIRED ABSENCE OF OTHER SPECIFIED PARTS OF DIGESTIVE TRACT: Chronic | ICD-10-CM

## 2024-11-07 DIAGNOSIS — R21 RASH AND OTHER NONSPECIFIC SKIN ERUPTION: ICD-10-CM

## 2024-11-07 DIAGNOSIS — Z98.890 OTHER SPECIFIED POSTPROCEDURAL STATES: Chronic | ICD-10-CM

## 2024-11-07 PROCEDURE — 99213 OFFICE O/P EST LOW 20 MIN: CPT

## 2024-11-07 RX ORDER — HYDROCORTISONE 25 MG/G
2.5 OINTMENT TOPICAL
Qty: 2 | Refills: 2 | Status: ACTIVE | COMMUNITY
Start: 2024-11-07 | End: 1900-01-01

## 2024-11-08 DIAGNOSIS — R21 RASH AND OTHER NONSPECIFIC SKIN ERUPTION: ICD-10-CM

## 2024-11-08 DIAGNOSIS — T36.8X5A ADVERSE EFFECT OF OTHER SYSTEMIC ANTIBIOTICS, INITIAL ENCOUNTER: ICD-10-CM

## 2024-11-08 DIAGNOSIS — Z87.828 PERSONAL HISTORY OF OTHER (HEALED) PHYSICAL INJURY AND TRAUMA: ICD-10-CM

## 2025-01-17 ENCOUNTER — APPOINTMENT (OUTPATIENT)
Dept: VASCULAR SURGERY | Facility: CLINIC | Age: 65
End: 2025-01-17
Payer: COMMERCIAL

## 2025-01-17 VITALS — SYSTOLIC BLOOD PRESSURE: 137 MMHG | DIASTOLIC BLOOD PRESSURE: 76 MMHG

## 2025-01-17 VITALS — WEIGHT: 220 LBS | BODY MASS INDEX: 30.8 KG/M2 | HEIGHT: 71 IN

## 2025-01-17 DIAGNOSIS — E78.00 PURE HYPERCHOLESTEROLEMIA, UNSPECIFIED: ICD-10-CM

## 2025-01-17 DIAGNOSIS — I83.892 VARICOSE VEINS OF LEFT LOWER EXTREMITY WITH OTHER COMPLICATIONS: ICD-10-CM

## 2025-01-17 DIAGNOSIS — I10 ESSENTIAL (PRIMARY) HYPERTENSION: ICD-10-CM

## 2025-01-17 DIAGNOSIS — I83.893 VARICOSE VEINS OF BILATERAL LOWER EXTREMITIES WITH OTHER COMPLICATIONS: ICD-10-CM

## 2025-01-17 PROCEDURE — 99204 OFFICE O/P NEW MOD 45 MIN: CPT

## 2025-01-17 PROCEDURE — 93970 EXTREMITY STUDY: CPT

## 2025-01-17 RX ORDER — ATORVASTATIN CALCIUM 80 MG/1
TABLET, FILM COATED ORAL
Refills: 0 | Status: ACTIVE | COMMUNITY

## 2025-01-17 RX ORDER — LISINOPRIL 30 MG/1
TABLET ORAL
Refills: 0 | Status: ACTIVE | COMMUNITY

## 2025-03-19 ENCOUNTER — APPOINTMENT (OUTPATIENT)
Dept: VASCULAR SURGERY | Facility: CLINIC | Age: 65
End: 2025-03-19
Payer: COMMERCIAL

## 2025-03-19 VITALS
SYSTOLIC BLOOD PRESSURE: 146 MMHG | BODY MASS INDEX: 30.8 KG/M2 | DIASTOLIC BLOOD PRESSURE: 86 MMHG | HEIGHT: 71 IN | WEIGHT: 220 LBS

## 2025-03-19 DIAGNOSIS — I83.893 VARICOSE VEINS OF BILATERAL LOWER EXTREMITIES WITH OTHER COMPLICATIONS: ICD-10-CM

## 2025-03-19 PROCEDURE — 99212 OFFICE O/P EST SF 10 MIN: CPT

## 2025-03-28 ENCOUNTER — NON-APPOINTMENT (OUTPATIENT)
Age: 65
End: 2025-03-28

## 2025-03-28 ENCOUNTER — APPOINTMENT (OUTPATIENT)
Facility: CLINIC | Age: 65
End: 2025-03-28
Payer: COMMERCIAL

## 2025-03-28 PROCEDURE — 92202 OPSCPY EXTND ON/MAC DRAW: CPT

## 2025-03-28 PROCEDURE — 92134 CPTRZ OPH DX IMG PST SGM RTA: CPT

## 2025-03-28 PROCEDURE — 99214 OFFICE O/P EST MOD 30 MIN: CPT

## 2025-04-08 ENCOUNTER — APPOINTMENT (OUTPATIENT)
Dept: VASCULAR SURGERY | Facility: CLINIC | Age: 65
End: 2025-04-08
Payer: COMMERCIAL

## 2025-04-08 PROCEDURE — 36475 ENDOVENOUS RF 1ST VEIN: CPT | Mod: LT

## 2025-04-25 ENCOUNTER — APPOINTMENT (OUTPATIENT)
Dept: VASCULAR SURGERY | Facility: CLINIC | Age: 65
End: 2025-04-25
Payer: COMMERCIAL

## 2025-04-25 VITALS — HEIGHT: 71 IN | BODY MASS INDEX: 30.8 KG/M2 | WEIGHT: 220 LBS

## 2025-04-25 DIAGNOSIS — I83.893 VARICOSE VEINS OF BILATERAL LOWER EXTREMITIES WITH OTHER COMPLICATIONS: ICD-10-CM

## 2025-04-25 PROCEDURE — 99212 OFFICE O/P EST SF 10 MIN: CPT

## 2025-04-25 PROCEDURE — 93971 EXTREMITY STUDY: CPT

## 2025-05-27 ENCOUNTER — APPOINTMENT (OUTPATIENT)
Dept: VASCULAR SURGERY | Facility: CLINIC | Age: 65
End: 2025-05-27
Payer: COMMERCIAL

## 2025-05-27 PROCEDURE — 36475 ENDOVENOUS RF 1ST VEIN: CPT | Mod: RT

## 2025-06-11 ENCOUNTER — NON-APPOINTMENT (OUTPATIENT)
Age: 65
End: 2025-06-11

## 2025-06-13 ENCOUNTER — APPOINTMENT (OUTPATIENT)
Dept: VASCULAR SURGERY | Facility: CLINIC | Age: 65
End: 2025-06-13
Payer: COMMERCIAL

## 2025-06-13 VITALS
SYSTOLIC BLOOD PRESSURE: 137 MMHG | HEART RATE: 64 BPM | HEIGHT: 71 IN | BODY MASS INDEX: 35 KG/M2 | DIASTOLIC BLOOD PRESSURE: 95 MMHG | WEIGHT: 250 LBS

## 2025-06-13 PROCEDURE — 93971 EXTREMITY STUDY: CPT

## 2025-06-13 PROCEDURE — 99212 OFFICE O/P EST SF 10 MIN: CPT
